# Patient Record
Sex: FEMALE | Race: WHITE | Employment: OTHER | URBAN - METROPOLITAN AREA
[De-identification: names, ages, dates, MRNs, and addresses within clinical notes are randomized per-mention and may not be internally consistent; named-entity substitution may affect disease eponyms.]

---

## 2017-08-11 ENCOUNTER — HOSPITAL ENCOUNTER (EMERGENCY)
Facility: CLINIC | Age: 47
Discharge: HOME | End: 2017-08-11
Attending: EMERGENCY MEDICINE | Admitting: EMERGENCY MEDICINE
Payer: COMMERCIAL

## 2017-08-11 VITALS
OXYGEN SATURATION: 98 % | TEMPERATURE: 98 F | HEIGHT: 65 IN | DIASTOLIC BLOOD PRESSURE: 94 MMHG | SYSTOLIC BLOOD PRESSURE: 117 MMHG | WEIGHT: 217 LBS | BODY MASS INDEX: 36.15 KG/M2 | RESPIRATION RATE: 16 BRPM

## 2017-08-11 DIAGNOSIS — F41.0 ANXIETY ATTACK: ICD-10-CM

## 2017-08-11 PROCEDURE — 99283 EMERGENCY DEPT VISIT LOW MDM: CPT

## 2017-08-11 ASSESSMENT — ENCOUNTER SYMPTOMS
HEADACHES: 1
CHEST TIGHTNESS: 0

## 2017-08-11 NOTE — ED NOTES
A&Ox4. ABC's intact. Pt c/o right shoulder/chest pain after a panic attack.  Her 6yo granddaughter went missing for about an hour and then pt had a panic attack.  Pain 10/10 at this time.

## 2017-08-11 NOTE — ED PROVIDER NOTES
"  History     Chief Complaint:  Panic Attack    HPI   Camial Juarez is a 47 year old female who presents to the emergency department today for evaluation of a panic attack. The patient reports approximately an hour prior to arrival she could not find her 5 year old granddaughter which triggered the panic attack with an onset of a headache. When EMS arrived, she was stuttering and she rated her headache a 10/10. She is under  \"too much stress\" from raising her daughter's children. Her daughter has addiction problems and is unable to care for the patient's grandchildren. The grandchildren have developmental issues which make raising them difficult along with being concerned for their safely because of past instances. She has been seeing a therapist. She denies chest tightness and suicidal ideation.     Allergies:  Penicillins  Compazine [Prochlorperazine]  Morphine  Sulfa Drugs  Tramadol    Medications:    oxycodone-acetaminophen   Hydromorphone   hyoscyamine  docusate sodium   PREDNISONE   Hydrocodone-ibuprofen   FOLIC ACID   Omeprazole-Sodium Bicarbonate   LORAZEPAM   exenatide   Duloxetine   gabapentin   methotrexate     Past Medical History:    LYNN  Anxiety  Type 2 diabetes  Rheumatoid arthritis  Fibromyalgia  Migraine    Past Surgical History:    BACK SURGERY    GYN SURGERY    hysterectomy  HYSTERECTOMY    LAPAROSCOPIC APPENDECTOMY   ORTHOPEDIC SURGERY      Family History:    History reviewed. No pertinent family history.     Social History:  The patient was accompanied to the ED by her sister.  Smoking Status: current  Smokeless Tobacco: never  Alcohol Use: no  Marital Status:  Single     Review of Systems   Respiratory: Negative for chest tightness.    Neurological: Positive for headaches.   Psychiatric/Behavioral: Negative for self-injury and suicidal ideas.   All other systems reviewed and are negative.       Physical Exam   Patient Vitals for the past 24 hrs:   BP Temp Temp src Heart Rate Resp SpO2 " "Height Weight   08/11/17 1350 (!) 117/94 98  F (36.7  C) Oral 87 16 98 % 1.651 m (5' 5\") 98.4 kg (217 lb)      Physical Exam  General: Patient is alert and interactive when I enter the room  Head:  The scalp, face, and head appear normal  Eyes:  Conjunctivae are normal  ENT:    The nose is normal    Pinnae are normal    External acoustic canals are normal  Neck:  Trachea midline  CV:  Pulses are normal, RRR    Resp:  No respiratory distress, CTAB   Abdomen:      Soft, non-tender, non-distended  Musc:  Normal muscular tone    No major joint effusions    No asymmetric leg swelling    Good capillary refill noted  Skin:  No rash or lesions noted  Neuro:  Speech is normal and fluent. Face is symmetric.     Moving all extremities well. Cranial nerves intact. Sensation intact.  Psych: Awake. Alert.  Normal affect.  Appropriate interactions.          Emergency Department Course     Nursing notes and vitals reviewed.  I performed an exam of the patient as documented above.   1430 Patient rechecked and updated.   I discussed the treatment plan with the patient. They expressed understanding of this plan and consented to discharge. They will be discharged home with instructions for care and follow up. In addition, the patient will return to the emergency department if their symptoms persist, worsen, if new symptoms arise or if there is any concern.  All questions were answered.  I personally reviewed the laboratory results with the Patient and sister and answered all related questions prior to discharge.    Impression & Plan      Medical Decision Making:  Camila Juarez is a 47 year old female who presents with fibre myalgia and anxiety. Vitals were unremarkable and physical exam was unremarkable with non-focal neurological exam. The patient had a very stressful day where her daughter, who is dealing with addiction issues, became involved in her granddaughter's care and she has custody of her. It sounds like the patient had " a panic attack today with chest tightness and hyperventilation and some shoulder pain that lead to her migraine. The patient had received an ECG per patient by the medics and per the patient it was normal. I discussed with the patient what she would like to do because on arrival into the room she mentioned she wanted to go home. The patient reported she had resolution of her chest tightness and only felt some residual nausea and felt like she was having a migraine. She has migraine treatments at home and does not want migraine treatment here. In terns of her chest tightness, labs and ECG were offered, however, the patient elected to go home. The differential for her chest tightness include ACS, anxiety, PE, dissection, GERD and others. The patient refused any workup because she reported she was fine. Discussion about the risks of going home including death, were had with the patient and the patient understood. She is not intoxicated and able to make her own decisions so this was likely a panic attack given her history and denied any suicidal ideations as well - so patient was clear for discharge.     Diagnosis:    ICD-10-CM    1. Anxiety attack F41.0        Disposition:  Discharged to home     Scribe Disclosure:  I, Thad Dover, am serving as a scribe at 2:28 PM on 8/11/2017 to document services personally performed by Desi Nguyễn MD based on my observations and the provider's statements to me.     8/11/2017   [facility] EMERGENCY DEPARTMENT       Desi Nguyễn MD  08/11/17 1872

## 2017-08-11 NOTE — DISCHARGE INSTRUCTIONS
Discharge Instructions  Mental Health Concerns    You were seen today for mental health concerns, such as depression, severe anxiety, or suicidal thinking. Your doctor feels that you do not require hospitalization at this time. However, your symptoms may become worse, and you may need to return to the Emergency Department. Most treatments of depression and suicidal thoughts are a process rather than a single intervention.  Medications and counseling can take several weeks or more to help.  It is important to follow up as discussed with your family doctor or counselor.      By accepting these discharge instructions:    You promise to not harm yourself or others.    You agree that if you feel you are becoming unable to keep that promise, you will do something to help yourself before you do anything to harm yourself or others.     You agree to keep any safety plan arranged on your visit here today.    You agree to take any medication prescribed or recommended by your doctor.    If you are getting worse, you can contact a friend or a family member, contact your counselor or family doctor, contact a crisis line, or other options discussed with the doctor or therapist today.    At any time, you can call 911 and return to the emergency department for more help.    You understand that follow-up is essential to your treatment, and you will make and keep appointments recommended on your visit today.    How to improve your mental health and prevent suicide:    Involve others by letting family, friends, counselors know.  Do not isolate yourself.    Avoid alcohol or drugs. Remove weapons, poisons from your home.    Try to stick to routines for eating, sleeping and getting regular exercise.      Try to get into sunlight. Bright natural light not only treats seasonal affective disorder but also depression.    Increase safe activities that you enjoy.    If you feel worse, contact [phone], or call 911, or your family  doctor/counselor for additional assistance.    If you were given a prescription for medicine here today, be sure to read all of the information (including the package insert) that comes with your prescription.  This will include important information about the medicine, its side effects, and any warnings that you need to know about.  The pharmacist who fills the prescription can provide more information and answer questions you may have about the medicine.  If you have questions or concerns that the pharmacist cannot address, please call or return to the Emergency Department.

## 2017-08-11 NOTE — ED AVS SNAPSHOT
[facility] Emergency Department    [address]    [facility] [address]    Phone:  [phone]    Fax:  [phone]                                       Camila Juarez   MRN: [id number]    Department:  [facility] Emergency Department   Date of Visit:  8/11/2017           After Visit Summary Signature Page     I have received my discharge instructions, and my questions have been answered. I have discussed any challenges I see with this plan with the nurse or doctor.    ..........................................................................................................................................  Patient/Patient Representative Signature      ..........................................................................................................................................  Patient Representative Print Name and Relationship to Patient    ..................................................               ................................................  Date                                            Time    ..........................................................................................................................................  Reviewed by Signature/Title    ...................................................              ..............................................  Date                                                            Time

## 2017-08-11 NOTE — ED AVS SNAPSHOT
[facility] Emergency Department    [address]    [facility] [address]    Phone:  [phone]    Fax:  [phone]                                       Camila Juarez   MRN: [id number]    Department:  [facility] Emergency Department   Date of Visit:  8/11/2017           Patient Information     Date Of Birth          1970        Your diagnoses for this visit were:     Anxiety attack        You were seen by Desi Nguyễn MD.      Follow-up Information     Follow up with Cristian Barrow MD In 3 days.    Specialty:  Family Practice    Contact information:    [facility]  [address] [facility] [address] [phone]          Discharge Instructions         Discharge Instructions  Mental Health Concerns    You were seen today for mental health concerns, such as depression, severe anxiety, or suicidal thinking. Your doctor feels that you do not require hospitalization at this time. However, your symptoms may become worse, and you may need to return to the Emergency Department. Most treatments of depression and suicidal thoughts are a process rather than a single intervention.  Medications and counseling can take several weeks or more to help.  It is important to follow up as discussed with your family doctor or counselor.      By accepting these discharge instructions:    You promise to not harm yourself or others.    You agree that if you feel you are becoming unable to keep that promise, you will do something to help yourself before you do anything to harm yourself or others.     You agree to keep any safety plan arranged on your visit here today.    You agree to take any medication prescribed or recommended by your doctor.    If you are getting worse, you can contact a friend or a family member, contact your counselor or family doctor, contact a crisis line, or other options discussed with the doctor or therapist today.    At any time,  you can call 911 and return to the emergency department for more help.    You understand that follow-up is essential to your treatment, and you will make and keep appointments recommended on your visit today.    How to improve your mental health and prevent suicide:    Involve others by letting family, friends, counselors know.  Do not isolate yourself.    Avoid alcohol or drugs. Remove weapons, poisons from your home.    Try to stick to routines for eating, sleeping and getting regular exercise.      Try to get into sunlight. Bright natural light not only treats seasonal affective disorder but also depression.    Increase safe activities that you enjoy.    If you feel worse, contact [phone], or call 911, or your family doctor/counselor for additional assistance.    If you were given a prescription for medicine here today, be sure to read all of the information (including the package insert) that comes with your prescription.  This will include important information about the medicine, its side effects, and any warnings that you need to know about.  The pharmacist who fills the prescription can provide more information and answer questions you may have about the medicine.  If you have questions or concerns that the pharmacist cannot address, please call or return to the Emergency Department.       24 Hour Appointment Hotline       To make an appointment at any [facility], call [phone] ([phone]). If you don't have a family doctor or clinic, we will help you find one. [facility] [place] clinics are conveniently located to serve the needs of you and your family.             Review of your medicines      Our records show that you are taking the medicines listed below. If these are incorrect, please call your family doctor or clinic.        Dose / Directions Last dose taken    CYMBALTA 30 MG EC capsule   Dose:  90 mg   Generic drug:  DULoxetine        Take 90 mg by mouth At Bedtime . 3 caps at HS    Refills:  0        docusate sodium 100 MG capsule   Commonly known as:  COLACE   Dose:  100 mg   Quantity:  8 capsule        Take 1 capsule (100 mg) by mouth 2 times daily as needed for constipation   Refills:  0        exenatide 10 MCG/0.04ML injection   Commonly known as:  BYETTA   Dose:  10 mcg        Inject 10 mcg Subcutaneous daily (with dinner)   Refills:  0        FOLIC ACID PO   Dose:  1 mg        Take 1 mg by mouth daily   Refills:  0        gabapentin 400 MG capsule   Commonly known as:  NEURONTIN   Dose:  1200 mg        Take 1,200 mg by mouth 3 times daily   Refills:  0        HYDROcodone-acetaminophen 7.5-325 MG per tablet   Commonly known as:  NORCO   Dose:  1 tablet        Take 1 tablet by mouth every 4 hours as needed for moderate to severe pain   Refills:  0        HYDROcodone-ibuprofen 7.5-200 MG per tablet   Commonly known as:  VICOPROFEN   Dose:  1 tablet        Take 1 tablet by mouth 2 times daily   Refills:  0        HYDROmorphone 2 MG tablet   Commonly known as:  DILAUDID   Dose:  2 mg   Quantity:  20 tablet        Take 1 tablet (2 mg) by mouth every 4 hours as needed for moderate to severe pain   Refills:  0        hyoscyamine 0.125 MG tablet   Commonly known as:  ANASPAZ/LEVSIN   Dose:  0.125-0.25 mg   Quantity:  10 tablet        Take 1-2 tablets (125-250 mcg) by mouth every 4 hours as needed for cramping   Refills:  1        LORAZEPAM PO   Dose:  0.5 mg        Take 0.5 mg by mouth as needed   Refills:  0        methotrexate 2.5 MG tablet CHEMO   Dose:  12.5 mg        Take 12.5 mg by mouth once a week On wednesday   Refills:  0        omeprazole-sodium bicarbonate  MG Caps per capsule   Dose:  1 capsule        Take 1 capsule by mouth every morning   Refills:  0        oxyCODONE HCl 5 MG Taba   Commonly known as:  OXECTA   Dose:  1-2 tablet   Quantity:  30 each        Take 1-2 tablets by mouth every 3 hours as needed (pain)   Refills:  0        oxyCODONE-acetaminophen  MG per  tablet   Commonly known as:  PERCOCET   Dose:  1 tablet        Take 1 tablet by mouth every 6 hours as needed for moderate to severe pain   Refills:  0        PREDNISONE PO   Dose:  10 mg        Take 10 mg by mouth every morning   Refills:  0                Orders Needing Specimen Collection     None      Pending Results     No orders found from 8/9/2017 to 8/12/2017.            Pending Culture Results     No orders found from 8/9/2017 to 8/12/2017.            Pending Results Instructions     If you had any lab results that were not finalized at the time of your Discharge, you can call the ED Lab Result RN at [phone]. You will be contacted by this team for any positive Lab results or changes in treatment. The nurses are available 7 days a week from 10A to 6:30P.  You can leave a message 24 hours per day and they will return your call.        Test Results From Your Hospital Stay               Clinical Quality Measure: Blood Pressure Screening     Your blood pressure was checked while you were in the emergency department today. The last reading we obtained was  BP: (!) 117/94 . Please read the guidelines below about what these numbers mean and what you should do about them.  If your systolic blood pressure (the top number) is less than 120 and your diastolic blood pressure (the bottom number) is less than 80, then your blood pressure is normal. There is nothing more that you need to do about it.  If your systolic blood pressure (the top number) is 120-139 or your diastolic blood pressure (the bottom number) is 80-89, your blood pressure may be higher than it should be. You should have your blood pressure rechecked within a year by a primary care provider.  If your systolic blood pressure (the top number) is 140 or greater or your diastolic blood pressure (the bottom number) is 90 or greater, you may have high blood pressure. High blood pressure is treatable, but if left untreated over time it can put you at risk  "for heart attack, stroke, or kidney failure. You should have your blood pressure rechecked by a primary care provider within the next 4 weeks.  If your provider in the emergency department today gave you specific instructions to follow-up with your doctor or provider even sooner than that, you should follow that instruction and not wait for up to 4 weeks for your follow-up visit.        Thank you for choosing [facility]       Thank you for choosing [facility] for your care. Our goal is always to provide you with excellent care. Hearing back from our patients is one way we can continue to improve our services. Please take a few minutes to complete the written survey that you may receive in the mail after you visit with us. Thank you!        [facility] Information     [employer] lets you send messages to your doctor, view your test results, renew your prescriptions, schedule appointments and more. To sign up, go to [email or web address] . Click on \"Log in\" on the left side of the screen, which will take you to the Welcome page. Then click on \"Sign up Now\" on the right side of the page.     You will be asked to enter the access code listed below, as well as some personal information. Please follow the directions to create your username and password.     Your access code is: [email or web address]  Expires: 2017  2:23 PM     Your access code will  in 90 days. If you need help or a new code, please call your [facility] clinic or [phone].        Care EveryWhere ID     This is your Care EveryWhere ID. This could be used by other organizations to access your [facility] medical records  [id number]        Equal Access to Services     AVA PRATT : Hadii eduardo Keith, waaxda luqkonstantin, qaybta kaallu santiago. So [facility] [phone].    ATENCIÓN: Si habla español, tiene a giang disposición servicios gratuitos de asistencia lingüística. Llame al [phone].    We comply with " applicable federal civil rights laws and [place] laws. We do not discriminate on the basis of race, color, national origin, age, disability sex, sexual orientation or gender identity.            After Visit Summary       This is your record. Keep this with you and show to your community pharmacist(s) and doctor(s) at your next visit.

## 2017-08-12 ENCOUNTER — HEALTH MAINTENANCE LETTER (OUTPATIENT)
Age: 47
End: 2017-08-12

## 2020-02-06 ENCOUNTER — HOSPITAL ENCOUNTER (EMERGENCY)
Facility: CLINIC | Age: 50
Discharge: HOME | End: 2020-02-06
Attending: EMERGENCY MEDICINE | Admitting: EMERGENCY MEDICINE
Payer: MEDICARE

## 2020-02-06 ENCOUNTER — APPOINTMENT (OUTPATIENT)
Dept: CT IMAGING | Facility: CLINIC | Age: 50
End: 2020-02-06
Attending: EMERGENCY MEDICINE
Payer: MEDICARE

## 2020-02-06 VITALS
WEIGHT: 210 LBS | OXYGEN SATURATION: 96 % | DIASTOLIC BLOOD PRESSURE: 47 MMHG | BODY MASS INDEX: 34.95 KG/M2 | RESPIRATION RATE: 18 BRPM | TEMPERATURE: 98.6 F | SYSTOLIC BLOOD PRESSURE: 98 MMHG

## 2020-02-06 DIAGNOSIS — K04.7 PERIAPICAL ABSCESS: ICD-10-CM

## 2020-02-06 LAB
ANION GAP SERPL CALCULATED.3IONS-SCNC: 2 MMOL/L (ref 3–14)
BASOPHILS # BLD AUTO: 0 10E9/L (ref 0–0.2)
BASOPHILS NFR BLD AUTO: 0.1 %
BUN SERPL-MCNC: 21 MG/DL (ref 7–30)
CALCIUM SERPL-MCNC: 9.7 MG/DL (ref 8.5–10.1)
CHLORIDE SERPL-SCNC: 104 MMOL/L (ref 94–109)
CO2 SERPL-SCNC: 30 MMOL/L (ref 20–32)
CREAT SERPL-MCNC: 0.68 MG/DL (ref 0.52–1.04)
DIFFERENTIAL METHOD BLD: ABNORMAL
EOSINOPHIL # BLD AUTO: 0 10E9/L (ref 0–0.7)
EOSINOPHIL NFR BLD AUTO: 0 %
ERYTHROCYTE [DISTWIDTH] IN BLOOD BY AUTOMATED COUNT: 12.2 % (ref 10–15)
GFR SERPL CREATININE-BSD FRML MDRD: >90 ML/MIN/{1.73_M2}
GLUCOSE SERPL-MCNC: 114 MG/DL (ref 70–99)
HCT VFR BLD AUTO: 45.4 % (ref 35–47)
HGB BLD-MCNC: 15.1 G/DL (ref 11.7–15.7)
IMM GRANULOCYTES # BLD: 0.1 10E9/L (ref 0–0.4)
IMM GRANULOCYTES NFR BLD: 0.5 %
LACTATE BLD-SCNC: 1.7 MMOL/L (ref 0.7–2)
LYMPHOCYTES # BLD AUTO: 3 10E9/L (ref 0.8–5.3)
LYMPHOCYTES NFR BLD AUTO: 27.3 %
MCH RBC QN AUTO: 32 PG (ref 26.5–33)
MCHC RBC AUTO-ENTMCNC: 33.3 G/DL (ref 31.5–36.5)
MCV RBC AUTO: 96 FL (ref 78–100)
MONOCYTES # BLD AUTO: 0.8 10E9/L (ref 0–1.3)
MONOCYTES NFR BLD AUTO: 7.4 %
NEUTROPHILS # BLD AUTO: 7.2 10E9/L (ref 1.6–8.3)
NEUTROPHILS NFR BLD AUTO: 64.7 %
NRBC # BLD AUTO: 0 10*3/UL
NRBC BLD AUTO-RTO: 0 /100
PLATELET # BLD AUTO: 303 10E9/L (ref 150–450)
POTASSIUM SERPL-SCNC: 3.6 MMOL/L (ref 3.4–5.3)
RBC # BLD AUTO: 4.72 10E12/L (ref 3.8–5.2)
SODIUM SERPL-SCNC: 136 MMOL/L (ref 133–144)
WBC # BLD AUTO: 11.1 10E9/L (ref 4–11)

## 2020-02-06 PROCEDURE — 99285 EMERGENCY DEPT VISIT HI MDM: CPT | Mod: 25

## 2020-02-06 PROCEDURE — 83605 ASSAY OF LACTIC ACID: CPT | Performed by: EMERGENCY MEDICINE

## 2020-02-06 PROCEDURE — 25000132 ZZH RX MED GY IP 250 OP 250 PS 637: Performed by: EMERGENCY MEDICINE

## 2020-02-06 PROCEDURE — 85025 COMPLETE CBC W/AUTO DIFF WBC: CPT | Performed by: EMERGENCY MEDICINE

## 2020-02-06 PROCEDURE — 80048 BASIC METABOLIC PNL TOTAL CA: CPT | Performed by: EMERGENCY MEDICINE

## 2020-02-06 PROCEDURE — 25000125 ZZHC RX 250: Performed by: EMERGENCY MEDICINE

## 2020-02-06 PROCEDURE — 99284 EMERGENCY DEPT VISIT MOD MDM: CPT | Mod: Z6 | Performed by: EMERGENCY MEDICINE

## 2020-02-06 PROCEDURE — 96365 THER/PROPH/DIAG IV INF INIT: CPT | Mod: 59

## 2020-02-06 PROCEDURE — 25000128 H RX IP 250 OP 636: Performed by: EMERGENCY MEDICINE

## 2020-02-06 PROCEDURE — 96375 TX/PRO/DX INJ NEW DRUG ADDON: CPT

## 2020-02-06 PROCEDURE — 25000128 H RX IP 250 OP 636: Performed by: STUDENT IN AN ORGANIZED HEALTH CARE EDUCATION/TRAINING PROGRAM

## 2020-02-06 PROCEDURE — 70487 CT MAXILLOFACIAL W/DYE: CPT

## 2020-02-06 PROCEDURE — 96366 THER/PROPH/DIAG IV INF ADDON: CPT

## 2020-02-06 RX ORDER — CLINDAMYCIN HCL 300 MG
300 CAPSULE ORAL 4 TIMES DAILY
COMMUNITY
End: 2020-06-21

## 2020-02-06 RX ORDER — CLINDAMYCIN PHOSPHATE 900 MG/50ML
900 INJECTION, SOLUTION INTRAVENOUS EVERY 8 HOURS
Status: DISCONTINUED | OUTPATIENT
Start: 2020-02-06 | End: 2020-02-06 | Stop reason: HOSPADM

## 2020-02-06 RX ORDER — MELOXICAM 15 MG/1
15 TABLET ORAL DAILY
COMMUNITY

## 2020-02-06 RX ORDER — IOPAMIDOL 755 MG/ML
75 INJECTION, SOLUTION INTRAVASCULAR ONCE
Status: COMPLETED | OUTPATIENT
Start: 2020-02-06 | End: 2020-02-06

## 2020-02-06 RX ORDER — BUSPIRONE HYDROCHLORIDE 10 MG/1
10 TABLET ORAL 2 TIMES DAILY
COMMUNITY

## 2020-02-06 RX ORDER — OXYCODONE HYDROCHLORIDE 5 MG/1
10 TABLET ORAL ONCE
Status: COMPLETED | OUTPATIENT
Start: 2020-02-06 | End: 2020-02-06

## 2020-02-06 RX ORDER — MODAFINIL 200 MG/1
200 TABLET ORAL DAILY
COMMUNITY

## 2020-02-06 RX ORDER — LIRAGLUTIDE 6 MG/ML
1.8 INJECTION SUBCUTANEOUS DAILY
COMMUNITY
End: 2020-06-21

## 2020-02-06 RX ORDER — KETOROLAC TROMETHAMINE 15 MG/ML
15 INJECTION, SOLUTION INTRAMUSCULAR; INTRAVENOUS ONCE
Status: COMPLETED | OUTPATIENT
Start: 2020-02-06 | End: 2020-02-06

## 2020-02-06 RX ORDER — VENLAFAXINE HYDROCHLORIDE 150 MG/1
150 CAPSULE, EXTENDED RELEASE ORAL DAILY
COMMUNITY

## 2020-02-06 RX ORDER — SIMVASTATIN 80 MG
80 TABLET ORAL AT BEDTIME
COMMUNITY

## 2020-02-06 RX ORDER — LISINOPRIL AND HYDROCHLOROTHIAZIDE 20; 25 MG/1; MG/1
1 TABLET ORAL DAILY
Status: ON HOLD | COMMUNITY
End: 2020-06-22

## 2020-02-06 RX ADMIN — OXYCODONE HYDROCHLORIDE 10 MG: 5 TABLET ORAL at 18:55

## 2020-02-06 RX ADMIN — IOPAMIDOL 75 ML: 755 INJECTION, SOLUTION INTRAVENOUS at 19:23

## 2020-02-06 RX ADMIN — CLINDAMYCIN PHOSPHATE 900 MG: 900 INJECTION, SOLUTION INTRAVENOUS at 18:47

## 2020-02-06 RX ADMIN — KETOROLAC TROMETHAMINE 15 MG: 15 INJECTION, SOLUTION INTRAMUSCULAR; INTRAVENOUS at 20:37

## 2020-02-06 NOTE — ED AVS SNAPSHOT
[facility] [place], Emergency Department  [address] [place] [address]  Phone:  [phone]                                    Camila Juarez   MRN: [id number]    Department:  [facility], Emergency Department   Date of Visit:  2/6/2020           After Visit Summary Signature Page    I have received my discharge instructions, and my questions have been answered. I have discussed any challenges I see with this plan with the nurse or doctor.    ..........................................................................................................................................  Patient/Patient Representative Signature      ..........................................................................................................................................  Patient Representative Print Name and Relationship to Patient    ..................................................               ................................................  Date                                   Time    ..........................................................................................................................................  Reviewed by Signature/Title    ...................................................              ..............................................  Date                                               Time          22EPIC Rev 08/18

## 2020-02-06 NOTE — ED TRIAGE NOTES
Camila ambulatory to ED for ongoing right lower dental pain/infection that started Sunday 2/2/2020.  Pt reports she saw her dentist on Tuesday and was told she needed a tooth extraction, but needed to be on antibiotics first. Pt started on Clindamycin 400 mg 4 times per day. Pt tried to go to  of  emergency dental clinic today and they could *not* fit her in the schedule. Pt has had increased right lower jaw pain and swelling, worse past few days.

## 2020-02-07 ASSESSMENT — ENCOUNTER SYMPTOMS
SHORTNESS OF BREATH: 0
EYE PAIN: 0
CHILLS: 0
FEVER: 0

## 2020-02-07 NOTE — DISCHARGE INSTRUCTIONS
Please go to Dental Clinic at 9:30am tomorrow at the [facility] ([address]). Call them at  to confirm.

## 2020-02-08 NOTE — ED PROVIDER NOTES
History     Chief Complaint   Patient presents with     Dental Pain     HPI  Camila Juarez is a 49 year old female with history of dental caries and recent diagnosis of tooth infection who comes in with complaints of worsening tooth pain and new facial swelling despite being started on antibiotics by her dentist 2 days prior to arrival.  She denies any fevers or chills, no pain about the mid face.    I have reviewed the Medications, Allergies, Past Medical and Surgical History, and Social History in the Epic system.    Review of Systems   Constitutional: Negative for chills and fever.   Eyes: Negative for pain.   Respiratory: Negative for shortness of breath.    Cardiovascular: Negative for chest pain.       Physical Exam   BP: (!) 151/89  Heart Rate: 61  Temp: 98.6  F (37  C)  Resp: 16  Weight: 95.3 kg (210 lb)  SpO2: 96 %      Physical Exam  Vitals signs and nursing note reviewed.   Constitutional:       General: She is not in acute distress.     Appearance: Normal appearance. She is not diaphoretic.   HENT:      Head: Atraumatic.      Comments: Mild right-sided mandibular swelling     Mouth/Throat:      Pharynx: No oropharyngeal exudate.      Comments: Poor dentition with exquisite tenderness at base of right mandible intraorally.  Eyes:      General: No scleral icterus.     Pupils: Pupils are equal, round, and reactive to light.   Cardiovascular:      Heart sounds: Normal heart sounds.   Pulmonary:      Effort: No respiratory distress.      Breath sounds: Normal breath sounds.   Abdominal:      General: Bowel sounds are normal.      Palpations: Abdomen is soft.      Tenderness: There is no abdominal tenderness.   Musculoskeletal:         General: No tenderness.   Skin:     General: Skin is warm.      Findings: No rash.   Neurological:      Mental Status: She is alert.         ED Course        Procedures                           Labs Ordered and Resulted from Time of ED Arrival Up to the Time of Departure  from the ED   CBC WITH PLATELETS DIFFERENTIAL - Abnormal; Notable for the following components:       Result Value    WBC 11.1 (*)     All other components within normal limits   BASIC METABOLIC PANEL - Abnormal; Notable for the following components:    Anion Gap 2 (*)     Glucose 114 (*)     All other components within normal limits   LACTIC ACID WHOLE BLOOD   PERIPHERAL IV CATHETER   NURSING DRAW AND HOLD            Assessments & Plan (with Medical Decision Making)     49-year-old female with complaint of right-sided tooth pain and infection despite analgesics and antibiotics at home.  IV established, patient given Toradol 50 mg IV and oxycodone 10 mg p.o. with adequate relief of her pain.  Labs are drawn sent reviewed document in epic remarkable for WBC of 11.1 thousand but otherwise normal CBC and normal electrolytes.  Patient was given clindamycin 900 mg IV and sent to CT for imaging of the facial bones which revealed periapical lucency around the left first molar but no clear drainable fluid collection.  Case discussed with dental resident who reviewed the images over the phone and will make arrangements for follow-up with dental clinic at 930 following morning.  Plan discussed with patient and she was agreeable.    I have reviewed the nursing notes.    I have reviewed the findings, diagnosis, plan and need for follow up with the patient.    Discharge Medication List as of 2/6/2020  8:34 PM          Final diagnoses:   Periapical abscess       2/6/2020   [facility], [place], EMERGENCY DEPARTMENT     Pual Espana MD  02/07/20 2045

## 2020-03-22 ENCOUNTER — HEALTH MAINTENANCE LETTER (OUTPATIENT)
Age: 50
End: 2020-03-22

## 2020-04-21 ENCOUNTER — HOSPITAL ENCOUNTER (EMERGENCY)
Facility: CLINIC | Age: 50
Discharge: HOME | End: 2020-04-21
Attending: EMERGENCY MEDICINE | Admitting: EMERGENCY MEDICINE
Payer: MEDICARE

## 2020-04-21 ENCOUNTER — APPOINTMENT (OUTPATIENT)
Dept: GENERAL RADIOLOGY | Facility: CLINIC | Age: 50
End: 2020-04-21
Attending: EMERGENCY MEDICINE
Payer: MEDICARE

## 2020-04-21 VITALS
RESPIRATION RATE: 19 BRPM | HEART RATE: 60 BPM | OXYGEN SATURATION: 96 % | SYSTOLIC BLOOD PRESSURE: 106 MMHG | DIASTOLIC BLOOD PRESSURE: 72 MMHG | TEMPERATURE: 98.7 F

## 2020-04-21 DIAGNOSIS — Z20.822 SUSPECTED 2019 NOVEL CORONAVIRUS INFECTION: ICD-10-CM

## 2020-04-21 LAB
ANION GAP SERPL CALCULATED.3IONS-SCNC: 5 MMOL/L (ref 3–14)
BASOPHILS # BLD AUTO: 0 10E9/L (ref 0–0.2)
BASOPHILS NFR BLD AUTO: 0.2 %
BUN SERPL-MCNC: 24 MG/DL (ref 7–30)
CALCIUM SERPL-MCNC: 9 MG/DL (ref 8.5–10.1)
CHLORIDE SERPL-SCNC: 104 MMOL/L (ref 94–109)
CO2 SERPL-SCNC: 28 MMOL/L (ref 20–32)
CREAT SERPL-MCNC: 0.63 MG/DL (ref 0.52–1.04)
DIFFERENTIAL METHOD BLD: ABNORMAL
EOSINOPHIL # BLD AUTO: 0 10E9/L (ref 0–0.7)
EOSINOPHIL NFR BLD AUTO: 0 %
ERYTHROCYTE [DISTWIDTH] IN BLOOD BY AUTOMATED COUNT: 12.7 % (ref 10–15)
GFR SERPL CREATININE-BSD FRML MDRD: >90 ML/MIN/{1.73_M2}
GLUCOSE SERPL-MCNC: 109 MG/DL (ref 70–99)
HCT VFR BLD AUTO: 43.7 % (ref 35–47)
HGB BLD-MCNC: 14.4 G/DL (ref 11.7–15.7)
IMM GRANULOCYTES # BLD: 0.1 10E9/L (ref 0–0.4)
IMM GRANULOCYTES NFR BLD: 0.9 %
LYMPHOCYTES # BLD AUTO: 2.6 10E9/L (ref 0.8–5.3)
LYMPHOCYTES NFR BLD AUTO: 22.1 %
MCH RBC QN AUTO: 32.1 PG (ref 26.5–33)
MCHC RBC AUTO-ENTMCNC: 33 G/DL (ref 31.5–36.5)
MCV RBC AUTO: 97 FL (ref 78–100)
MONOCYTES # BLD AUTO: 0.8 10E9/L (ref 0–1.3)
MONOCYTES NFR BLD AUTO: 7.3 %
NEUTROPHILS # BLD AUTO: 8.1 10E9/L (ref 1.6–8.3)
NEUTROPHILS NFR BLD AUTO: 69.5 %
NRBC # BLD AUTO: 0 10*3/UL
NRBC BLD AUTO-RTO: 0 /100
PLATELET # BLD AUTO: 315 10E9/L (ref 150–450)
POTASSIUM SERPL-SCNC: 3.8 MMOL/L (ref 3.4–5.3)
RBC # BLD AUTO: 4.49 10E12/L (ref 3.8–5.2)
SODIUM SERPL-SCNC: 137 MMOL/L (ref 133–144)
TROPONIN I SERPL-MCNC: <0.015 UG/L (ref 0–0.04)
WBC # BLD AUTO: 11.6 10E9/L (ref 4–11)

## 2020-04-21 PROCEDURE — 71045 X-RAY EXAM CHEST 1 VIEW: CPT

## 2020-04-21 PROCEDURE — 85025 COMPLETE CBC W/AUTO DIFF WBC: CPT | Performed by: EMERGENCY MEDICINE

## 2020-04-21 PROCEDURE — 84484 ASSAY OF TROPONIN QUANT: CPT | Performed by: EMERGENCY MEDICINE

## 2020-04-21 PROCEDURE — 25800030 ZZH RX IP 258 OP 636: Performed by: EMERGENCY MEDICINE

## 2020-04-21 PROCEDURE — 96374 THER/PROPH/DIAG INJ IV PUSH: CPT

## 2020-04-21 PROCEDURE — 99285 EMERGENCY DEPT VISIT HI MDM: CPT | Mod: 25

## 2020-04-21 PROCEDURE — 96361 HYDRATE IV INFUSION ADD-ON: CPT

## 2020-04-21 PROCEDURE — 80048 BASIC METABOLIC PNL TOTAL CA: CPT | Performed by: EMERGENCY MEDICINE

## 2020-04-21 PROCEDURE — 93005 ELECTROCARDIOGRAM TRACING: CPT

## 2020-04-21 PROCEDURE — 25000128 H RX IP 250 OP 636: Performed by: EMERGENCY MEDICINE

## 2020-04-21 RX ORDER — KETOROLAC TROMETHAMINE 30 MG/ML
30 INJECTION, SOLUTION INTRAMUSCULAR; INTRAVENOUS ONCE
Status: COMPLETED | OUTPATIENT
Start: 2020-04-21 | End: 2020-04-21

## 2020-04-21 RX ADMIN — SODIUM CHLORIDE 1000 ML: 9 INJECTION, SOLUTION INTRAVENOUS at 21:02

## 2020-04-21 RX ADMIN — KETOROLAC TROMETHAMINE 30 MG: 30 INJECTION, SOLUTION INTRAMUSCULAR at 21:02

## 2020-04-21 ASSESSMENT — ENCOUNTER SYMPTOMS
FEVER: 0
NAUSEA: 0
FATIGUE: 1
CHILLS: 1
MYALGIAS: 1
DIARRHEA: 0
SORE THROAT: 1
COUGH: 1
SHORTNESS OF BREATH: 1
VOMITING: 0

## 2020-04-21 NOTE — ED AVS SNAPSHOT
[facility]  [address]  [facility] [address]  Phone:  [phone]  Fax:  [phone]                                    Camila Juarez   MRN: [id number]    Department:  [facility] Emergency Department   Date of Visit:  4/21/2020           After Visit Summary Signature Page    I have received my discharge instructions, and my questions have been answered. I have discussed any challenges I see with this plan with the nurse or doctor.    ..........................................................................................................................................  Patient/Patient Representative Signature      ..........................................................................................................................................  Patient Representative Print Name and Relationship to Patient    ..................................................               ................................................  Date                                   Time    ..........................................................................................................................................  Reviewed by Signature/Title    ...................................................              ..............................................  Date                                               Time          22EPIC Rev 08/18

## 2020-04-22 LAB — INTERPRETATION ECG - MUSE: NORMAL

## 2020-04-22 NOTE — ED NOTES
Patient and family (via speaker phone) educated on COVID testing guidelines, self-isolation, and symptom management. Verbalized understanding.

## 2020-04-22 NOTE — ED PROVIDER NOTES
History     Chief Complaint:  Chest pain, SOB     HPI   Camila Juarez is a 49 year old female with a history of anxiety, depression, diabetes, GERD, hypertension and rheumatoid arthritis who presents to the emergency department for evaluation of multiple complaints including chest pain, shortness of breath, body aches, chills and generalized fatigue.  Patient reports 3 weeks of upper respiratory symptoms which began with cough, low-grade fever and pleuritic chest pain.  She was evaluated at outside clinic on 4/14/2020.  At that time she had a chest x-ray performed which did not show any acute signs of pneumonia however she was started on Omnicef and prednisone.  She reports that she took her antibiotics and steroids without significant improvement of her symptoms.  Thus prompting her presentation to the emergency department today.  Here she reports ongoing chest pain and shortness of breath but notes her cough is improved.  The patient notes her chills and generalized fatigue have continued.  Patient notes that she has been taking care of her grandchildren who have had some similar symptoms over the past month.    Allergies:  Penicillins  Compazine [Prochlorperazine]  Morphine  Sulfa Drugs  Tramadol     Medications:    Buspar  Cleocin  Colace  Neurontin  Victoza  Lisinopril  Lorazepam  Meloxicam  Provigil  Omeprazole-sodium bicarbonate  Zyampza  Zocor  Effexor    Past Medical History:    Anxiety  Back pain  Depression  Diabetes  Endometriosis  Fibromyalgia  GERD  Hypertension  Migraine  Rheumatoid arthritis with rheumatoid factor    Past Surgical History:    Back surgery  GYN surgery  Hysterectomy  Laparoscopic appendectomy  Orthopedic surgery    Family History:    No past pertinent family history.    Social History:  The patient presents today alone.  Smoking Status: Current Every Day Smoker  Smokeless Tobacco: Never Used  Alcohol Use: No  Drug Use: No  PCP: Mor Lopez      Review of Systems    Constitutional: Positive for chills and fatigue. Negative for fever.   HENT: Positive for sore throat.    Respiratory: Positive for cough and shortness of breath.    Cardiovascular: Positive for chest pain.   Gastrointestinal: Negative for diarrhea, nausea and vomiting.   Musculoskeletal: Positive for myalgias.   All other systems reviewed and are negative.    Physical Exam     Patient Vitals for the past 24 hrs:   BP Temp Heart Rate Resp SpO2   04/21/20 2039 109/79 98.7  F (37.1  C) 79 18 95 %        Physical Exam  General: Patient is awake, alert and interactive when I enter the room. Obese, resting comfortably in bed.   Head: The scalp, face, and head appear normal  Eyes: The pupils are equal, round, and reactive to light. Conjunctivae and sclerae are normal  Neck: Normal range of motion. No anterior cervical lymphadenopathy noted  CV: Regular rate. S1/S2. No murmurs.   Resp: Lungs are clear without wheezes or rales. No respiratory distress.   GI: Abdomen is soft, no rigidity, guarding, or rebound. No distension. No tenderness to palpation in [id number] quadrant.     MS: Normal tone. Joints grossly normal without effusions. No asymmetric leg swelling, calf or thigh tenderness.    Skin: No rash or lesions noted. Normal capillary refill noted  Neuro: Speech is normal and fluent. Face is symmetric. Moving all extremities.   Psych:  Normal affect.  Appropriate interactions.    Emergency Department Course     ECG:  Time: 2053  Vent. Rate 65 bpm. CO interval 134. QRS duration 104. QT/QTc 426/443. P-R-T axis 21 -2 38.  Sinus rhythm  Normal ECG    Imaging:  Radiology findings were communicated with the patient who voiced understanding of the findings.    XR Chest Port 1 View:  Heart size and pulmonary vascularity normal. The lungs are clear.  As per radiology.    Laboratory:  Laboratory findings were communicated with the patient who voiced understanding of the findings.    CBC: WBC 11.6 (H) o/w WNL. (HGB 14.4, )    BMP: Glucose 109 (H) o/w WNL (Creatinine 0.63)    Troponin I <0.015      Interventions:  2102 NS 1L IV    2102 Toradol 30 mg IV    Emergency Department Course:    2039 Nursing notes and vitals reviewed.    2042 I performed an exam of the patient as documented above.     2046 IV was inserted and blood was drawn for laboratory testing, results above.    2053 EKG obtained as noted above.    2056 The patient was sent for a XR Chest Port 1 View while in the emergency department, results above.      Findings and plan explained to the Patient. Patient discharged home with instructions regarding supportive care, medications, and reasons to return. The importance of close follow-up was reviewed.     Impression & Plan     Medical Decision Making:  Camila Juarez is a 49 year old female with past medical history of anxiety, depression, diabetes, GERD, hypertension and rheumatoid arthritis who presents to the emergency department for evaluation of multiple complaints including chest pain, shortness of breath, body aches, chills and generalized fatigue.  Please see HPI for more details.  Upon initial evaluation here she is hemodynamically stable with normal vital signs.  She is afebrile.  Chest x-ray was obtained which did not show any acute signs of pneumonia, pneumothorax, widened mediastinum, pleural effusion or pulmonary edema.  EKG did not show any significant signs of ischemia nor dysrhythmia.  Troponin x1 is negative.  Given the length of her symptoms and overall constellation of her presentation I feel that ACS is much less likely in this scenario.  This may represent COVID-19 or additional viral upper respiratory infection.  Given that the patient is otherwise hemodynamically stable without significant hypoxia or fever, I do not believe that the patient requires testing here today nor admission to the hospital. Return to the ED for shortness of breath, or confusion.  There is no signs of serious bacterial  infection such as bacteremia, meningitis, UTI/pyelonephritis, strep pharyngitis, etc. I discussed my findings and plan with the patient and they are amenable at this time.  All questions were answered and patient will be discharged home in stable condition.     Discharge Diagnosis:    ICD-10-CM    1. Suspected 2019 Novel Coronavirus Infection  Z20.828      Disposition:  The patient is discharged to home.     Scribe Disclosure:  I, Castillo Shelton, am serving as a scribe at 8:39 PM on 4/21/2020 to document services personally performed by Brock Jesus MD based on my observations and the provider's statements to me.      Brock Jesus MD  04/21/20 3640

## 2020-04-22 NOTE — ED NOTES
Bed: ED16  Expected date: 4/21/20  Expected time: 8:32 PM  Means of arrival: Ambulance  Comments:  Narendra 596 49F chest pain/sob/pna

## 2020-06-21 ENCOUNTER — APPOINTMENT (OUTPATIENT)
Dept: GENERAL RADIOLOGY | Facility: CLINIC | Age: 50
End: 2020-06-21
Attending: EMERGENCY MEDICINE
Payer: MEDICARE

## 2020-06-21 ENCOUNTER — HOSPITAL ENCOUNTER (OUTPATIENT)
Facility: CLINIC | Age: 50
Setting detail: OBSERVATION
Discharge: HOME | End: 2020-06-22
Attending: EMERGENCY MEDICINE | Admitting: HOSPITALIST
Payer: MEDICARE

## 2020-06-21 ENCOUNTER — APPOINTMENT (OUTPATIENT)
Dept: MRI IMAGING | Facility: CLINIC | Age: 50
End: 2020-06-21
Attending: EMERGENCY MEDICINE
Payer: MEDICARE

## 2020-06-21 ENCOUNTER — APPOINTMENT (OUTPATIENT)
Dept: CT IMAGING | Facility: CLINIC | Age: 50
End: 2020-06-21
Attending: EMERGENCY MEDICINE
Payer: MEDICARE

## 2020-06-21 DIAGNOSIS — R25.9 ABNORMAL INVOLUNTARY MOVEMENT: ICD-10-CM

## 2020-06-21 DIAGNOSIS — G43.809 OTHER MIGRAINE WITHOUT STATUS MIGRAINOSUS, NOT INTRACTABLE: Primary | ICD-10-CM

## 2020-06-21 DIAGNOSIS — R51.9 NONINTRACTABLE HEADACHE, UNSPECIFIED CHRONICITY PATTERN, UNSPECIFIED HEADACHE TYPE: ICD-10-CM

## 2020-06-21 LAB
ALBUMIN SERPL-MCNC: 3.4 G/DL (ref 3.4–5)
ALBUMIN UR-MCNC: NEGATIVE MG/DL
ALP SERPL-CCNC: 69 U/L (ref 40–150)
ALT SERPL W P-5'-P-CCNC: 25 U/L (ref 0–50)
ANION GAP SERPL CALCULATED.3IONS-SCNC: 8 MMOL/L (ref 3–14)
APPEARANCE UR: CLEAR
AST SERPL W P-5'-P-CCNC: 19 U/L (ref 0–45)
BASOPHILS # BLD AUTO: 0 10E9/L (ref 0–0.2)
BASOPHILS NFR BLD AUTO: 0.2 %
BILIRUB DIRECT SERPL-MCNC: <0.1 MG/DL (ref 0–0.2)
BILIRUB SERPL-MCNC: 0.2 MG/DL (ref 0.2–1.3)
BILIRUB UR QL STRIP: NEGATIVE
BUN SERPL-MCNC: 43 MG/DL (ref 7–30)
CALCIUM SERPL-MCNC: 8.6 MG/DL (ref 8.5–10.1)
CHLORIDE SERPL-SCNC: 100 MMOL/L (ref 94–109)
CO2 SERPL-SCNC: 26 MMOL/L (ref 20–32)
COLOR UR AUTO: NORMAL
CREAT SERPL-MCNC: 1.27 MG/DL (ref 0.52–1.04)
CRP SERPL-MCNC: 14.9 MG/L (ref 0–8)
DIFFERENTIAL METHOD BLD: NORMAL
EOSINOPHIL # BLD AUTO: 0 10E9/L (ref 0–0.7)
EOSINOPHIL NFR BLD AUTO: 0 %
ERYTHROCYTE [DISTWIDTH] IN BLOOD BY AUTOMATED COUNT: 12 % (ref 10–15)
ERYTHROCYTE [SEDIMENTATION RATE] IN BLOOD BY WESTERGREN METHOD: 11 MM/H (ref 0–30)
GFR SERPL CREATININE-BSD FRML MDRD: 49 ML/MIN/{1.73_M2}
GLUCOSE SERPL-MCNC: 164 MG/DL (ref 70–99)
GLUCOSE UR STRIP-MCNC: NEGATIVE MG/DL
HBA1C MFR BLD: 6.2 % (ref 0–5.6)
HCT VFR BLD AUTO: 43.5 % (ref 35–47)
HGB BLD-MCNC: 14.5 G/DL (ref 11.7–15.7)
HGB UR QL STRIP: NEGATIVE
IMM GRANULOCYTES # BLD: 0.1 10E9/L (ref 0–0.4)
IMM GRANULOCYTES NFR BLD: 0.5 %
KETONES UR STRIP-MCNC: NEGATIVE MG/DL
LACTATE BLD-SCNC: 1.7 MMOL/L (ref 0.7–2)
LEUKOCYTE ESTERASE UR QL STRIP: NEGATIVE
LYMPHOCYTES # BLD AUTO: 2.6 10E9/L (ref 0.8–5.3)
LYMPHOCYTES NFR BLD AUTO: 25.3 %
MCH RBC QN AUTO: 31.8 PG (ref 26.5–33)
MCHC RBC AUTO-ENTMCNC: 33.3 G/DL (ref 31.5–36.5)
MCV RBC AUTO: 95 FL (ref 78–100)
MONOCYTES # BLD AUTO: 0.9 10E9/L (ref 0–1.3)
MONOCYTES NFR BLD AUTO: 9.2 %
NEUTROPHILS # BLD AUTO: 6.5 10E9/L (ref 1.6–8.3)
NEUTROPHILS NFR BLD AUTO: 64.8 %
NITRATE UR QL: NEGATIVE
NRBC # BLD AUTO: 0 10*3/UL
NRBC BLD AUTO-RTO: 0 /100
PH UR STRIP: 5 PH (ref 5–7)
PLATELET # BLD AUTO: 271 10E9/L (ref 150–450)
POTASSIUM SERPL-SCNC: 3.8 MMOL/L (ref 3.4–5.3)
PROT SERPL-MCNC: 7.2 G/DL (ref 6.8–8.8)
RBC # BLD AUTO: 4.56 10E12/L (ref 3.8–5.2)
RBC #/AREA URNS AUTO: 0 /HPF (ref 0–2)
SARS-COV-2 PCR COMMENT: NORMAL
SARS-COV-2 RNA SPEC QL NAA+PROBE: NEGATIVE
SARS-COV-2 RNA SPEC QL NAA+PROBE: NORMAL
SODIUM SERPL-SCNC: 134 MMOL/L (ref 133–144)
SOURCE: NORMAL
SP GR UR STRIP: 1.01 (ref 1–1.03)
SPECIMEN SOURCE: NORMAL
SPECIMEN SOURCE: NORMAL
SQUAMOUS #/AREA URNS AUTO: <1 /HPF (ref 0–1)
UROBILINOGEN UR STRIP-MCNC: NORMAL MG/DL (ref 0–2)
WBC # BLD AUTO: 10.1 10E9/L (ref 4–11)
WBC #/AREA URNS AUTO: 0 /HPF (ref 0–5)

## 2020-06-21 PROCEDURE — 70450 CT HEAD/BRAIN W/O DYE: CPT

## 2020-06-21 PROCEDURE — 83605 ASSAY OF LACTIC ACID: CPT | Performed by: EMERGENCY MEDICINE

## 2020-06-21 PROCEDURE — 80048 BASIC METABOLIC PNL TOTAL CA: CPT | Performed by: EMERGENCY MEDICINE

## 2020-06-21 PROCEDURE — U0003 INFECTIOUS AGENT DETECTION BY NUCLEIC ACID (DNA OR RNA); SEVERE ACUTE RESPIRATORY SYNDROME CORONAVIRUS 2 (SARS-COV-2) (CORONAVIRUS DISEASE [COVID-19]), AMPLIFIED PROBE TECHNIQUE, MAKING USE OF HIGH THROUGHPUT TECHNOLOGIES AS DESCRIBED BY CMS-2020-01-R: HCPCS | Performed by: EMERGENCY MEDICINE

## 2020-06-21 PROCEDURE — 85652 RBC SED RATE AUTOMATED: CPT | Performed by: EMERGENCY MEDICINE

## 2020-06-21 PROCEDURE — 87040 BLOOD CULTURE FOR BACTERIA: CPT | Performed by: EMERGENCY MEDICINE

## 2020-06-21 PROCEDURE — 82947 ASSAY GLUCOSE BLOOD QUANT: CPT | Mod: 91 | Performed by: HOSPITALIST

## 2020-06-21 PROCEDURE — 36415 COLL VENOUS BLD VENIPUNCTURE: CPT | Performed by: HOSPITALIST

## 2020-06-21 PROCEDURE — 70553 MRI BRAIN STEM W/O & W/DYE: CPT

## 2020-06-21 PROCEDURE — 93005 ELECTROCARDIOGRAM TRACING: CPT

## 2020-06-21 PROCEDURE — 25000128 H RX IP 250 OP 636: Performed by: EMERGENCY MEDICINE

## 2020-06-21 PROCEDURE — 80076 HEPATIC FUNCTION PANEL: CPT | Performed by: EMERGENCY MEDICINE

## 2020-06-21 PROCEDURE — 96375 TX/PRO/DX INJ NEW DRUG ADDON: CPT

## 2020-06-21 PROCEDURE — 99220 ZZC INITIAL OBSERVATION CARE,LEVL III: CPT | Performed by: HOSPITALIST

## 2020-06-21 PROCEDURE — 96361 HYDRATE IV INFUSION ADD-ON: CPT

## 2020-06-21 PROCEDURE — 96376 TX/PRO/DX INJ SAME DRUG ADON: CPT

## 2020-06-21 PROCEDURE — 96374 THER/PROPH/DIAG INJ IV PUSH: CPT

## 2020-06-21 PROCEDURE — 86140 C-REACTIVE PROTEIN: CPT | Performed by: EMERGENCY MEDICINE

## 2020-06-21 PROCEDURE — 99285 EMERGENCY DEPT VISIT HI MDM: CPT | Mod: 25

## 2020-06-21 PROCEDURE — G0378 HOSPITAL OBSERVATION PER HR: HCPCS

## 2020-06-21 PROCEDURE — 36415 COLL VENOUS BLD VENIPUNCTURE: CPT | Performed by: EMERGENCY MEDICINE

## 2020-06-21 PROCEDURE — C9803 HOPD COVID-19 SPEC COLLECT: HCPCS

## 2020-06-21 PROCEDURE — 71045 X-RAY EXAM CHEST 1 VIEW: CPT

## 2020-06-21 PROCEDURE — 99207 ZZC CDG-CODE CATEGORY CHANGED: CPT | Performed by: HOSPITALIST

## 2020-06-21 PROCEDURE — 83036 HEMOGLOBIN GLYCOSYLATED A1C: CPT | Performed by: HOSPITALIST

## 2020-06-21 PROCEDURE — A9585 GADOBUTROL INJECTION: HCPCS | Performed by: EMERGENCY MEDICINE

## 2020-06-21 PROCEDURE — 25500064 ZZH RX 255 OP 636: Performed by: EMERGENCY MEDICINE

## 2020-06-21 PROCEDURE — 81001 URINALYSIS AUTO W/SCOPE: CPT | Performed by: EMERGENCY MEDICINE

## 2020-06-21 PROCEDURE — 25800030 ZZH RX IP 258 OP 636: Performed by: EMERGENCY MEDICINE

## 2020-06-21 PROCEDURE — 85025 COMPLETE CBC W/AUTO DIFF WBC: CPT | Performed by: EMERGENCY MEDICINE

## 2020-06-21 RX ORDER — LORAZEPAM 2 MG/ML
0.5 INJECTION INTRAMUSCULAR ONCE
Status: COMPLETED | OUTPATIENT
Start: 2020-06-21 | End: 2020-06-21

## 2020-06-21 RX ORDER — TRAZODONE HYDROCHLORIDE 150 MG/1
150 TABLET ORAL AT BEDTIME
COMMUNITY
Start: 2020-03-24

## 2020-06-21 RX ORDER — VENLAFAXINE HYDROCHLORIDE 75 MG/1
75 CAPSULE, EXTENDED RELEASE ORAL DAILY
Status: DISCONTINUED | OUTPATIENT
Start: 2020-06-22 | End: 2020-06-21

## 2020-06-21 RX ORDER — NALOXONE HYDROCHLORIDE 0.4 MG/ML
.1-.4 INJECTION, SOLUTION INTRAMUSCULAR; INTRAVENOUS; SUBCUTANEOUS
Status: DISCONTINUED | OUTPATIENT
Start: 2020-06-21 | End: 2020-06-22 | Stop reason: HOSPADM

## 2020-06-21 RX ORDER — PRAZOSIN HYDROCHLORIDE 2 MG/1
2 CAPSULE ORAL AT BEDTIME
Status: ON HOLD | COMMUNITY
Start: 2020-03-24 | End: 2020-06-22

## 2020-06-21 RX ORDER — ONDANSETRON 4 MG/1
4 TABLET, ORALLY DISINTEGRATING ORAL EVERY 6 HOURS PRN
Status: DISCONTINUED | OUTPATIENT
Start: 2020-06-21 | End: 2020-06-22 | Stop reason: HOSPADM

## 2020-06-21 RX ORDER — LORAZEPAM 0.5 MG/1
0.5 TABLET ORAL EVERY 6 HOURS PRN
Status: DISCONTINUED | OUTPATIENT
Start: 2020-06-21 | End: 2020-06-22 | Stop reason: HOSPADM

## 2020-06-21 RX ORDER — GABAPENTIN 400 MG/1
800 CAPSULE ORAL 4 TIMES DAILY
Status: DISCONTINUED | OUTPATIENT
Start: 2020-06-21 | End: 2020-06-22 | Stop reason: HOSPADM

## 2020-06-21 RX ORDER — PANTOPRAZOLE SODIUM 40 MG/1
40 TABLET, DELAYED RELEASE ORAL DAILY PRN
COMMUNITY
Start: 2020-03-09

## 2020-06-21 RX ORDER — ERGOCALCIFEROL 1.25 MG/1
50000 CAPSULE, LIQUID FILLED ORAL
COMMUNITY
Start: 2020-03-19

## 2020-06-21 RX ORDER — NICOTINE POLACRILEX 4 MG
15-30 LOZENGE BUCCAL
Status: DISCONTINUED | OUTPATIENT
Start: 2020-06-21 | End: 2020-06-22 | Stop reason: HOSPADM

## 2020-06-21 RX ORDER — ACETAMINOPHEN 650 MG/1
650 SUPPOSITORY RECTAL EVERY 4 HOURS PRN
Status: DISCONTINUED | OUTPATIENT
Start: 2020-06-21 | End: 2020-06-22 | Stop reason: HOSPADM

## 2020-06-21 RX ORDER — KETOROLAC TROMETHAMINE 15 MG/ML
15 INJECTION, SOLUTION INTRAMUSCULAR; INTRAVENOUS ONCE
Status: COMPLETED | OUTPATIENT
Start: 2020-06-21 | End: 2020-06-21

## 2020-06-21 RX ORDER — DEXTROSE MONOHYDRATE 25 G/50ML
25-50 INJECTION, SOLUTION INTRAVENOUS
Status: DISCONTINUED | OUTPATIENT
Start: 2020-06-21 | End: 2020-06-22 | Stop reason: HOSPADM

## 2020-06-21 RX ORDER — VENLAFAXINE HYDROCHLORIDE 75 MG/1
225 CAPSULE, EXTENDED RELEASE ORAL DAILY
Status: DISCONTINUED | OUTPATIENT
Start: 2020-06-22 | End: 2020-06-22 | Stop reason: HOSPADM

## 2020-06-21 RX ORDER — OXYCODONE HCL 10 MG/1
10 TABLET, FILM COATED, EXTENDED RELEASE ORAL EVERY 12 HOURS
Status: DISCONTINUED | OUTPATIENT
Start: 2020-06-21 | End: 2020-06-22 | Stop reason: HOSPADM

## 2020-06-21 RX ORDER — LORAZEPAM 0.5 MG/1
0.5 TABLET ORAL EVERY 6 HOURS PRN
COMMUNITY

## 2020-06-21 RX ORDER — SIMVASTATIN 40 MG
80 TABLET ORAL AT BEDTIME
Status: DISCONTINUED | OUTPATIENT
Start: 2020-06-21 | End: 2020-06-22 | Stop reason: HOSPADM

## 2020-06-21 RX ORDER — VENLAFAXINE HYDROCHLORIDE 75 MG/1
75 CAPSULE, EXTENDED RELEASE ORAL DAILY
COMMUNITY

## 2020-06-21 RX ORDER — BUSPIRONE HYDROCHLORIDE 10 MG/1
10 TABLET ORAL 2 TIMES DAILY
Status: DISCONTINUED | OUTPATIENT
Start: 2020-06-21 | End: 2020-06-22 | Stop reason: HOSPADM

## 2020-06-21 RX ORDER — GADOBUTROL 604.72 MG/ML
10 INJECTION INTRAVENOUS ONCE
Status: COMPLETED | OUTPATIENT
Start: 2020-06-21 | End: 2020-06-21

## 2020-06-21 RX ORDER — SODIUM CHLORIDE 9 MG/ML
1000 INJECTION, SOLUTION INTRAVENOUS CONTINUOUS
Status: DISCONTINUED | OUTPATIENT
Start: 2020-06-21 | End: 2020-06-21

## 2020-06-21 RX ORDER — ONDANSETRON 2 MG/ML
4 INJECTION INTRAMUSCULAR; INTRAVENOUS EVERY 6 HOURS PRN
Status: DISCONTINUED | OUTPATIENT
Start: 2020-06-21 | End: 2020-06-22 | Stop reason: HOSPADM

## 2020-06-21 RX ORDER — METOCLOPRAMIDE HYDROCHLORIDE 5 MG/ML
5 INJECTION INTRAMUSCULAR; INTRAVENOUS ONCE
Status: COMPLETED | OUTPATIENT
Start: 2020-06-21 | End: 2020-06-21

## 2020-06-21 RX ORDER — ACETAMINOPHEN 325 MG/1
650 TABLET ORAL EVERY 4 HOURS PRN
Status: DISCONTINUED | OUTPATIENT
Start: 2020-06-21 | End: 2020-06-22 | Stop reason: HOSPADM

## 2020-06-21 RX ORDER — PRAZOSIN HYDROCHLORIDE 1 MG/1
1 CAPSULE ORAL AT BEDTIME
Status: ON HOLD | COMMUNITY
Start: 2018-07-15 | End: 2020-06-22

## 2020-06-21 RX ORDER — GABAPENTIN 800 MG/1
800 TABLET ORAL 4 TIMES DAILY
COMMUNITY

## 2020-06-21 RX ORDER — DIPHENHYDRAMINE HYDROCHLORIDE 50 MG/ML
25 INJECTION INTRAMUSCULAR; INTRAVENOUS ONCE
Status: COMPLETED | OUTPATIENT
Start: 2020-06-21 | End: 2020-06-21

## 2020-06-21 RX ORDER — MODAFINIL 200 MG/1
200 TABLET ORAL DAILY
Status: DISCONTINUED | OUTPATIENT
Start: 2020-06-22 | End: 2020-06-22 | Stop reason: HOSPADM

## 2020-06-21 RX ADMIN — LORAZEPAM 0.5 MG: 2 INJECTION INTRAMUSCULAR; INTRAVENOUS at 19:35

## 2020-06-21 RX ADMIN — SODIUM CHLORIDE 1000 ML: 9 INJECTION, SOLUTION INTRAVENOUS at 16:38

## 2020-06-21 RX ADMIN — SODIUM CHLORIDE 1000 ML: 9 INJECTION, SOLUTION INTRAVENOUS at 16:01

## 2020-06-21 RX ADMIN — METOCLOPRAMIDE HYDROCHLORIDE 5 MG: 5 INJECTION INTRAMUSCULAR; INTRAVENOUS at 18:57

## 2020-06-21 RX ADMIN — DIPHENHYDRAMINE HYDROCHLORIDE 25 MG: 50 INJECTION, SOLUTION INTRAMUSCULAR; INTRAVENOUS at 18:57

## 2020-06-21 RX ADMIN — KETOROLAC TROMETHAMINE 15 MG: 15 INJECTION, SOLUTION INTRAMUSCULAR; INTRAVENOUS at 18:09

## 2020-06-21 RX ADMIN — GADOBUTROL 10 ML: 604.72 INJECTION INTRAVENOUS at 16:13

## 2020-06-21 RX ADMIN — LORAZEPAM 0.5 MG: 2 INJECTION INTRAMUSCULAR; INTRAVENOUS at 16:10

## 2020-06-21 ASSESSMENT — ENCOUNTER SYMPTOMS
SHORTNESS OF BREATH: 1
FEVER: 0
HEADACHES: 1

## 2020-06-21 NOTE — ED TRIAGE NOTES
Patient here with shaking, restlessness and stuttering speech for 1.5 weeks and getting worse. Expressive aphasia in triage, Her sister is helping with interview in triage. She has left eye droopiness since last week, on and off. She had fever/chills/shortness of breath last Sunday and Monday with continued shortness of breath when walking.     Sister Felisa [phone]

## 2020-06-21 NOTE — ED PROVIDER NOTES
History     Chief Complaint:  Shaking; Chest Pain; and Aphasia    HPI   Camila Juarez is a 49 year old female who presents with abnormal movements, slurre speech and headache.  Patient reported onset last Saturday she developed a migraine.  It started in the back of her head.  She then felt very tired and slept for most of the next few days.  She has had migraines in the past.  She started to have drooping on her's face slurred speech and abnormal movements.  In the past she has had slurred speech and facial droop with her migraines.  She is seen a neurologist in the past.  She denies any fevers although has been sweaty.  Denies any cough.  Has had some degree of shortness of breath.  She does take Ativan at home.    Allergies:  Penicillins  Compazine  Morphine  Sulfa Drugs  Tramadol    Medications:    Buspar  Cleocin  Colace  Neurontin  Victoza  Prinzide  Lorazepam  Meloxicam  Provigil  Omeprazole-sodium bicarbonate  Zocor  Effexor  Lioresal   Prednisone  Omnicef  Zofran  Protonix  Desyrel  Bentyl    Past Medical History:    Anxiety  Depressive disorder  Diabetes  Endometriosis  Fibromyalgia  GERD  HTN  Migraine  Rheumatoid arthritis without rheumatoid factor    Past Surgical History:    Back surgery  Hysterectomy  Laparoscopic appendectomy  Arthroscopy knee    Family History:    Father: Arthritis, diabetes, heart disease, HTN, hyperlipidemia, stroke  Mother: Lung cancer  Sister: HTN  Brother(s): Arthritis, HTN    Social History:  The patient was unaccompanied to the ED.  Smoking Status: current every day smoker  Smokeless Tobacco: never used  Alcohol Use: no  Drug Use: no  Marital Status:  Single [1]    Review of Systems   Constitutional: Negative for fever.   Respiratory: Positive for shortness of breath.    Neurological: Positive for headaches.   All other systems reviewed and are negative.    Physical Exam     Patient Vitals for the past 24 hrs:   BP Temp Temp src Pulse Heart Rate Resp SpO2 Weight    06/21/20 1830 105/64 -- -- 75 74 18 94 % --   06/21/20 1815 (!) 87/51 -- -- -- 70 15 92 % --   06/21/20 1800 (!) 82/56 -- -- 75 74 14 93 % --   06/21/20 1750 -- -- -- -- 75 15 93 % --   06/21/20 1745 -- -- -- -- 79 19 93 % --   06/21/20 1740 105/54 -- -- 73 75 16 96 % --   06/21/20 1700 -- -- -- -- 76 -- 93 % --   06/21/20 1650 100/58 -- -- 84 81 15 94 % --   06/21/20 1640 96/53 -- -- 76 78 18 93 % --   06/21/20 1638 -- 98.1  F (36.7  C) Oral -- -- -- -- --   06/21/20 1637 106/50 -- -- -- -- -- -- --   06/21/20 1630 (!) 83/52 -- -- 81 -- -- 93 % --   06/21/20 1610 95/58 -- -- 81 -- -- 94 % --   06/21/20 1600 -- -- -- -- -- -- 94 % --   06/21/20 1510 -- -- -- -- 92 20 94 % --   06/21/20 1500 94/61 -- -- 90 87 16 96 % --   06/21/20 1407 110/72 99.1  F (37.3  C) Oral -- 101 20 99 % 96.6 kg (213 lb)     Physical Exam  General: Patient is alert and interactive when I enter the room  Head:  The scalp, face, and head appear normal  Eyes:  Conjunctivae are normal  ENT:    The nose is normal    Pinnae are normal    External acoustic canals are normal  Neck:  Trachea midline  CV:  Pulses are normal.    Resp:  No respiratory distress   Abdomen:      Soft, non-tender, non-distended  Musc:  Normal muscular tone    No major joint effusions    No asymmetric leg swelling  Skin:  No rash or lesions noted  Neuro: Speech is normal and fluent. Face is symmetric.     Difficulty  with raising the left lower extremity however able to do upper extremities without difficulty and against gravity, left side with abnormal movements that are intermittent, as were rolling around however with focus were able to do so with complete extraocular movements with no nystagmus, at times moving head all around in circles with focus her symptoms seem to improve, stuttering speech but no dysarthria or aphasia  Psych: Awake. Alert.  Normal affect.  Appropriate interactions.      Emergency Department Course   ECG:  Indication: chest pain   ECG taken at  1431, ECG read at 1434 by Dr. Desi Nguyễn MD  Sinus tachycardia  Otherwise normal ECG  Rate 104 bpm. AK interval 120. QRS duration 100. QT/QTc 320/420. P-R-T axes 55 4 64.    Imaging:  Radiology findings were communicated with the patient who voiced understanding of the findings.    CT head w/o contrast  IMPRESSION: No acute pathology, no bleed, mass, or acute infarcts.   Reading per radiology    XR chest port 1 view  IMPRESSION: No pleural fluid or pneumothorax. No airspace disease. There is mild vascular engorgement. The cardiomediastinal silhouette is at the upper limits of normal in size, and may be accentuated by technique.   Reading per radiology    MR brain w/o & w contrast  IMPRESSION:   1.  No evidence of acute infarction or other acute intracranial abnormality.   2.  Findings compatible with mild chronic small vessel ischemic change.   Reading per radiology     Laboratory:  Laboratory findings were communicated with the patient who voiced understanding of the findings.    CBC: AWNL (WBC 10.1, HGB 14.5, )  BMP: glucose 164(H), BUN 43(H), creatinine 1.27(H), GFR estimate 49(L)  Blood Culture x2: Pending  Lactic Acid (Resulted 1508): 1.7    UA with Microscopic: AWNL    Symptomatic COVID-19 virus (Coronavirus) by PCR: pending  SARS-CoV-2 COVID-19 virus (Coronavirus) RT-PCR: pending     Interventions:  1601 0.9% NaCl Bolus 1000 mL IV\  1610 Ativan 0.5 mg IV  1613 Gadavist 10 mL IV  1638 0.9% NaCl Bolus 1000 mL IV  1809 Toradol 15 mg IV  1857 Reglan 5 mg IV  1857 Benadryl 25 mg IV  1935 Ativan 0.5 mg IV  Emergency Department Course:  Past medical records, nursing notes, and vitals reviewed.    (1430)   I performed an exam of the patient as documented above.     EKG obtained in the ED, see results above.     IV was inserted and blood was drawn for laboratory testing, results above.    The patient was sent for a CT head w/o contrast while in the emergency department, results above.     A nasal swab was  obtained for laboratory testing, findings above.    The patient provided a urine sample here in the emergency department. This was sent for laboratory testing, findings above.     (1936)   I spoke with Dr. Ramirez of the Hospitalist service regarding patient's presentation, findings, and plan of care, who agrees to accept patient for further care, evaluation and monitoring.     Findings and plan explained to the Patient who consents to admission. Discussed the patient with Dr. Ramirez, who will admit the patient to a bed for further monitoring, evaluation, and treatment.    I personally reviewed the laboratory and imaging results with the Patient and answered all related questions prior to admission.     Impression & Plan   Medical Decision Making:  Camila Juarez is a 48 yo with history of fibromyalgia, anxiety and migraines who presents with multiple symptoms.  On arrival to the room she had very abnormal movements that seem to change with intent and focus.  She seemed to be fairly neurologically intact although had some weakness to her left lower extremity which she says is somewhat normal for her although it is worsened than usual.  She did not seem to be too overtly concerned about her abnormal movements and was talking through it.  She is completely alert and oriented.  She is afebrile.  I considered infectious etiology of her symptoms she had some shortness of breath so we did do some COVID swabbing.  This is pending at the time.  CT was done which was unremarkable.  Blood work was otherwise unremarkable with no leukocytosis.  With her abnormal movements I would be concerned about some chorea although I do not see any medications that would potentially cause this and she seems completely oriented and neurologically intact that I think a CNS infection would be unlikely.  She is also moving her neck with no difficulty at all.  I did give her 0.5 Ativan IV and this greatly helped her slurred speech.  When I  went to talk to her again she was still having headaches we gave her Toradol and then Reglan and Benadryl.  She then relayed to me as her slurred speech was much better than sometimes a degree of this will happen with her migraines but nothing this severe.  She does have a LORETO and I suspect this is because she is just been laying in bed.  Her stuttering and movements came back so we gave her another dose of Ativan.  Her headache is better but not and not gone.  She did have episodic low blood pressures but on recheck they would be in the 90s to 100s.  Her lactate is normal she is otherwise well-appearing with no infectious etiology of her symptoms so I doubt sepsis.  Regardless we will admit patient to [facility] for continued treatment of her headache and further work-up of these abnormal movements if they continue despite headache resolution.    Covid-19  Camila Juarez was evaluated during a global COVID-19 pandemic, which necessitated consideration that the patient might be at risk for infection with the SARS-CoV-2 virus that causes COVID-19.   Applicable protocols for evaluation were followed during the patient's care.   COVID-19 was considered as part of the patient's evaluation. The plan for testing is:  a test was obtained during this visit.    Diagnosis:    ICD-10-CM    1. Nonintractable headache, unspecified chronicity pattern, unspecified headache type  R51        Disposition:  Admitted to Dr. Dye.    Scribe Disclosure:  I, Herber Natarajan, am serving as a scribe at 2:23 PM on 6/21/2020 to document services personally performed by Desi Nguyễn MD based on my observations and the provider's statements to me.   6/21/2020   [facility] EMERGENCY DEPARTMENT       Desi Nguyễn MD  06/21/20 2031

## 2020-06-22 VITALS
SYSTOLIC BLOOD PRESSURE: 130 MMHG | BODY MASS INDEX: 35.45 KG/M2 | DIASTOLIC BLOOD PRESSURE: 58 MMHG | HEART RATE: 67 BPM | WEIGHT: 213 LBS | RESPIRATION RATE: 16 BRPM | TEMPERATURE: 99 F | OXYGEN SATURATION: 94 %

## 2020-06-22 LAB
ANION GAP SERPL CALCULATED.3IONS-SCNC: 4 MMOL/L (ref 3–14)
BASOPHILS # BLD AUTO: 0 10E9/L (ref 0–0.2)
BASOPHILS NFR BLD AUTO: 0.1 %
BUN SERPL-MCNC: 30 MG/DL (ref 7–30)
CALCIUM SERPL-MCNC: 7.8 MG/DL (ref 8.5–10.1)
CHLORIDE SERPL-SCNC: 107 MMOL/L (ref 94–109)
CO2 SERPL-SCNC: 27 MMOL/L (ref 20–32)
CREAT SERPL-MCNC: 0.8 MG/DL (ref 0.52–1.04)
DIFFERENTIAL METHOD BLD: NORMAL
EOSINOPHIL # BLD AUTO: 0 10E9/L (ref 0–0.7)
EOSINOPHIL NFR BLD AUTO: 0 %
ERYTHROCYTE [DISTWIDTH] IN BLOOD BY AUTOMATED COUNT: 11.9 % (ref 10–15)
GFR SERPL CREATININE-BSD FRML MDRD: 85 ML/MIN/{1.73_M2}
GLUCOSE BLDC GLUCOMTR-MCNC: 104 MG/DL (ref 70–99)
GLUCOSE BLDC GLUCOMTR-MCNC: 116 MG/DL (ref 70–99)
GLUCOSE BLDC GLUCOMTR-MCNC: 173 MG/DL (ref 70–99)
GLUCOSE SERPL-MCNC: 91 MG/DL (ref 70–99)
GLUCOSE SERPL-MCNC: 96 MG/DL (ref 70–99)
HCT VFR BLD AUTO: 38.3 % (ref 35–47)
HGB BLD-MCNC: 12.6 G/DL (ref 11.7–15.7)
IMM GRANULOCYTES # BLD: 0 10E9/L (ref 0–0.4)
IMM GRANULOCYTES NFR BLD: 0.3 %
INTERPRETATION ECG - MUSE: NORMAL
LYMPHOCYTES # BLD AUTO: 3.3 10E9/L (ref 0.8–5.3)
LYMPHOCYTES NFR BLD AUTO: 37 %
MCH RBC QN AUTO: 32 PG (ref 26.5–33)
MCHC RBC AUTO-ENTMCNC: 32.9 G/DL (ref 31.5–36.5)
MCV RBC AUTO: 97 FL (ref 78–100)
MONOCYTES # BLD AUTO: 0.9 10E9/L (ref 0–1.3)
MONOCYTES NFR BLD AUTO: 10.6 %
NEUTROPHILS # BLD AUTO: 4.6 10E9/L (ref 1.6–8.3)
NEUTROPHILS NFR BLD AUTO: 52 %
NRBC # BLD AUTO: 0 10*3/UL
NRBC BLD AUTO-RTO: 0 /100
PLATELET # BLD AUTO: 224 10E9/L (ref 150–450)
POTASSIUM SERPL-SCNC: 4 MMOL/L (ref 3.4–5.3)
RBC # BLD AUTO: 3.94 10E12/L (ref 3.8–5.2)
SODIUM SERPL-SCNC: 138 MMOL/L (ref 133–144)
WBC # BLD AUTO: 8.9 10E9/L (ref 4–11)

## 2020-06-22 PROCEDURE — 85025 COMPLETE CBC W/AUTO DIFF WBC: CPT | Performed by: HOSPITALIST

## 2020-06-22 PROCEDURE — 96375 TX/PRO/DX INJ NEW DRUG ADDON: CPT

## 2020-06-22 PROCEDURE — 25800030 ZZH RX IP 258 OP 636: Performed by: HOSPITALIST

## 2020-06-22 PROCEDURE — 25000132 ZZH RX MED GY IP 250 OP 250 PS 637: Mod: GY | Performed by: HOSPITALIST

## 2020-06-22 PROCEDURE — 80048 BASIC METABOLIC PNL TOTAL CA: CPT | Performed by: HOSPITALIST

## 2020-06-22 PROCEDURE — G0378 HOSPITAL OBSERVATION PER HR: HCPCS

## 2020-06-22 PROCEDURE — 40000893 ZZH STATISTIC PT IP EVAL DEFER: Performed by: PHYSICAL THERAPIST

## 2020-06-22 PROCEDURE — 36415 COLL VENOUS BLD VENIPUNCTURE: CPT | Performed by: HOSPITALIST

## 2020-06-22 PROCEDURE — 25000128 H RX IP 250 OP 636: Performed by: HOSPITALIST

## 2020-06-22 PROCEDURE — 00000146 ZZHCL STATISTIC GLUCOSE BY METER IP

## 2020-06-22 PROCEDURE — 99217 ZZC OBSERVATION CARE DISCHARGE: CPT | Performed by: HOSPITALIST

## 2020-06-22 RX ORDER — DIHYDROERGOTAMINE MESYLATE 4 MG/ML
1 SPRAY NASAL PRN
Qty: 1 ML | Refills: 0 | Status: SHIPPED | OUTPATIENT
Start: 2020-06-22

## 2020-06-22 RX ORDER — DIHYDROERGOTAMINE MESYLATE 1 MG/ML
1 INJECTION, SOLUTION INTRAMUSCULAR; INTRAVENOUS; SUBCUTANEOUS ONCE
Status: COMPLETED | OUTPATIENT
Start: 2020-06-22 | End: 2020-06-22

## 2020-06-22 RX ORDER — SODIUM CHLORIDE 9 MG/ML
INJECTION, SOLUTION INTRAVENOUS CONTINUOUS
Status: DISCONTINUED | OUTPATIENT
Start: 2020-06-22 | End: 2020-06-22 | Stop reason: HOSPADM

## 2020-06-22 RX ORDER — DIHYDROERGOTAMINE MESYLATE 4 MG/ML
1 SPRAY NASAL ONCE
Status: DISCONTINUED | OUTPATIENT
Start: 2020-06-22 | End: 2020-06-22 | Stop reason: RX

## 2020-06-22 RX ADMIN — ACETAMINOPHEN 650 MG: 325 TABLET, FILM COATED ORAL at 02:09

## 2020-06-22 RX ADMIN — SODIUM CHLORIDE: 9 INJECTION, SOLUTION INTRAVENOUS at 04:51

## 2020-06-22 RX ADMIN — GABAPENTIN 800 MG: 400 CAPSULE ORAL at 00:04

## 2020-06-22 RX ADMIN — SIMVASTATIN 80 MG: 40 TABLET, FILM COATED ORAL at 00:06

## 2020-06-22 RX ADMIN — DIHYDROERGOTAMINE MESYLATE 1 MG: 1 INJECTION, SOLUTION INTRAMUSCULAR; INTRAVENOUS; SUBCUTANEOUS at 14:28

## 2020-06-22 RX ADMIN — OXYCODONE HYDROCHLORIDE 10 MG: 10 TABLET, FILM COATED, EXTENDED RELEASE ORAL at 12:55

## 2020-06-22 RX ADMIN — GABAPENTIN 800 MG: 400 CAPSULE ORAL at 08:11

## 2020-06-22 RX ADMIN — BUSPIRONE HYDROCHLORIDE 10 MG: 10 TABLET ORAL at 08:11

## 2020-06-22 RX ADMIN — BUSPIRONE HYDROCHLORIDE 10 MG: 10 TABLET ORAL at 00:04

## 2020-06-22 RX ADMIN — MODAFINIL 200 MG: 200 TABLET ORAL at 08:13

## 2020-06-22 RX ADMIN — VENLAFAXINE HYDROCHLORIDE 225 MG: 75 CAPSULE, EXTENDED RELEASE ORAL at 08:11

## 2020-06-22 RX ADMIN — OXYCODONE HYDROCHLORIDE 10 MG: 10 TABLET, FILM COATED, EXTENDED RELEASE ORAL at 00:06

## 2020-06-22 RX ADMIN — TRAZODONE HYDROCHLORIDE 150 MG: 100 TABLET ORAL at 00:06

## 2020-06-22 NOTE — PHARMACY-ADMISSION MEDICATION HISTORY
Admission medication history interview status for this patient is complete. See [facility] admission navigator for allergy information, prior to admission medications and immunization status.     Medication history interview done via telephone during Covid-19 pandemic, indicate source(s): Family - sister Felisa  Medication history resources (including written lists, pill bottles, clinic record): Home med bottles, SureScripts and Care Everywhere    Changes made to PTA medication list:  Added: vitamin D2 (ran out and needs new Rx), pantoprazole, prazosin, trazodone  Deleted: clindamycin, docusate, liraglutide, omeprazole-sodium bicarb  Changed: buspirone 15mg TID --> 10mg BID. Gabapentin 1200mg TID --> 800mg QID. Lisinopril-hydrochlorothiazide 20-12.5mg daily --> lisinopril-hydrochlorothiazide 20-25mg daily. meloxicam 10mg --> 15mg daily. Lorazepam 0.5mg as needed --> 0.5mg every 6 hours prn. Modafinil 300mg daily --> 200mg daily. Venlafaxine XR 150mg daily --> XR 225mg daily.    Actions taken by pharmacist (provider contacted, etc): Called patient's sister to verify med list.     Additional medication history information: Patient has not taken medications for the past several days. Patient's sister thought she was having a once monthly diabetes injectable medication but there was no record-she was sure it was not the daily liraglutide. Patient was starting to have migraines again and was looking into a new Rx for migraine medication. Patient is out of Vitamin D2 prescription and needs a refill.     Medication reconciliation/reorder completed by provider prior to medication history?  N   (Y/N)     For patients on insulin therapy: N  (Y/N)    Prior to Admission medications    Medication Sig Last Dose Taking? Auth Provider   busPIRone (BUSPAR) 10 MG tablet Take 10 mg by mouth 2 times daily  Past Week at Unknown time Yes Reported, Patient   gabapentin (NEURONTIN) 800 MG tablet Take 800 mg by mouth 4 times daily Past Week at  Unknown time Yes Unknown, Entered By History   lisinopril-hydrochlorothiazide (ZESTORETIC) 20-25 MG tablet Take 1 tablet by mouth daily  Past Week at Unknown time Yes Reported, Patient   LORazepam (ATIVAN) 0.5 MG tablet Take 0.5 mg by mouth every 6 hours as needed for anxiety Past Month at Unknown time Yes Unknown, Entered By History   meloxicam (MOBIC) 15 MG tablet Take 15 mg by mouth daily  Past Week at Unknown time Yes Reported, Patient   modafinil (PROVIGIL) 200 MG tablet Take 200 mg by mouth daily  Past Week at Unknown time Yes Reported, Patient   oxyCODONE (XTAMPZA ER) 9 MG 12 hr tablet Take 9 mg by mouth every 12 hours Takes twice per day. Past Week at Unknown time Yes Reported, Patient   pantoprazole (PROTONIX) 40 MG EC tablet Take 40 mg by mouth daily as needed Past Month at Unknown time Yes Unknown, Entered By History   prazosin (MINIPRESS) 1 MG capsule Take 1 mg by mouth At Bedtime With 2mg capsule for total dose = 3mg Past Week at Unknown time Yes Unknown, Entered By History   prazosin (MINIPRESS) 2 MG capsule Take 2 mg by mouth At Bedtime With 1 mg capsule for total dose = 3mg Past Week at Unknown time Yes Unknown, Entered By History   simvastatin (ZOCOR) 80 MG tablet Take 80 mg by mouth At Bedtime  Past Week at Unknown time Yes Reported, Patient   traZODone (DESYREL) 150 MG tablet Take 150 mg by mouth At Bedtime Past Week at Unknown time Yes Unknown, Entered By History   venlafaxine (EFFEXOR-XR) 150 MG 24 hr capsule Take 150 mg by mouth daily With 75mg capsule for total dose = 225mg Past Week at Unknown time Yes Reported, Patient   venlafaxine (EFFEXOR-XR) 75 MG 24 hr capsule Take 75 mg by mouth daily With 150mg capsule for total dose = 225mg Past Week at Unknown time Yes Unknown, Entered By History   vitamin D2 (ERGOCALCIFEROL) 81170 units (1250 mcg) capsule Take 50,000 Units by mouth every 7 days On Mon 6/15/2020 Yes Unknown, Entered By History

## 2020-06-22 NOTE — PLAN OF CARE
PRIMARY DIAGNOSIS: ACUTE PAIN  OUTPATIENT/OBSERVATION GOALS TO BE MET BEFORE DISCHARGE:  1. Pain Status: Patient sleeping, has received scheduled oxycodone and prn tylenol po.    2. Return to near baseline physical activity: No, SBA x1 w/gb, pivots to commode, steady, tolerated activity well but reports dizziness.     3. Cleared for discharge by consultants (if involved): No, to be seen by therapy, neuro, psych, and social work.    Discharge Planner Nurse   Safe discharge environment identified: No, lives at home alone, has SW consulted.   Barriers to discharge: Yes       Entered by: Shanta Montano 06/22/2020 6:07 AM     Please review provider order for any additional goals.   Nurse to notify provider when observation goals have been met and patient is ready for discharge.      Patient Bps lower 80s-90s SBP, MD paged to inform about symptomatic BP, patient dizzy with activity, BP was taken while patient at rest. NS 0.9 started at 100/hr per order. LS clear, patient on RA.

## 2020-06-22 NOTE — DISCHARGE SUMMARY
[facility]  Hospitalist Discharge Summary      Date of Admission:  6/21/2020  Date of Discharge:  6/22/2020  4:44 PM  Discharging Provider: Simone Waters MD      Discharge Diagnoses   Migraine headache, involuntary movements    Follow-ups Needed After Discharge   Follow-up Appointments     Follow-up and recommended labs and tests       Follow up with primary care provider, Mor Pond, within 7 days   for hospital follow- up. Follow-up on your blood pressure. No follow up   labs or test are needed. [facility] and [facility]   carotid ultrasound             Unresulted Labs Ordered in the Past 30 Days of this Admission     Date and Time Order Name Status Description    6/21/2020 1455 Blood culture Preliminary     6/21/2020 1437 Blood culture Preliminary       These results will be followed up by Hospitalist group    Discharge Disposition   Discharged to home  Condition at discharge: Stable      Hospital Course   Camila Juarez is a 49 year old female who has a history of migraine headache, depression/anxiety, Essential hypertension, type 2 diabetes and other comorbidities as listed below. She presents with a bizarre cluster of symptoms of headache, slurred speech and uncontrolled body movements.  According to the patient a week ago on Saturday, she had a migraine headache crisis.  In consequence she had profound tiredness and spent most of the time in the next few days in bed and sleeping.  Today she has the onset of facial drooping, slurred speech and uncontrollable movement of her limbs for which she presented to the emergency department for an assessment.  It looks like in the past with migraine crisis she had had facial droop and slurred speech.  I discussed her case with Dr. Nguyễn the emergency physician, and in her opinion, the patient did very well with the use of Ativan in ED.  The patient herself was admitting to have high level of anxiety.  No symptoms of  "any acute infection documented pertaining to respiratory system, gastrointestinal or urinary.  Despite of a very thorough work-up, there are no findings of acute neurological insult to explain her presentation.  She has an acute kidney injury (likely prerenal) and elevation of glycemia but not any impressive finding in this preliminary work-up.  On my exam the patient is making movement of the head and neck side-to-side, that do not resemble any particular movement disorder.  At the same time, her speech is not exactly slurred, she is able to to communicate in full sentences such as: \"Am I going to have all my medications tonight?\"  \"Can I have ice chips?\"  \" I need to go potty\" But in between she makes   stuttering sounds.    Patient was admitted to the Obs Unit. Imaging here (CT head, MRI brain, CXR) were normal. Labs were normal. Patient was seen by Neurology who recommended outpt work-up. Patient did have some hypotension here. I have recommended she stop her prazosin and hydrochlorothiazide/lisinopril and follow-up with her BP with her PCP. I saw the patient multiple times today. When I saw her int he am, she did not have any abnormal movements. She had a 3/10 headache. She stated Migranal works for her but she did not have any due to insurance issues. I gave her an IM dose here. I also filled out the forms to try to have her insurance company cover this. After she was seen by Neuro, I saw the patient again. At this time she was having frequent movements of her head and arms. There were occasional movements of her legs but not as frequent. I explained the plan for her (including referral to the Movement Disorders Neurology Dept at the  and outpt carotid US. I explained I completed the on-line form for her insurance to cover Migranal. I called her sister and updated her. Patient discharged home.     Consultations This Hospital Stay   SOCIAL WORK IP CONSULT  CARE COORDINATOR IP CONSULT  PHYSICAL THERAPY ADULT IP " CONSULT  OCCUPATIONAL THERAPY ADULT IP CONSULT  PSYCHIATRY IP CONSULT  NEUROLOGY IP CONSULT  NEUROLOGY IP CONSULT    Code Status   Full Code    Time Spent on this Encounter   I, Simone Waters MD, personally saw the patient today and spent greater than 30 minutes discharging this patient.       Simone Waters MD  [facility]  ______________________________________________________________________    Physical Exam   Vital Signs: Temp: 99  F (37.2  C) Temp src: Oral BP: 130/58 Pulse: 67 Heart Rate: 74 Resp: 16 SpO2: 94 % O2 Device: None (Room air)    Weight: 213 lbs 0 oz  Constitutional: awake, alert, cooperative, no apparent distress, and appears stated age  Eyes: Lids and lashes normal, pupils equal, round and reactive to light, extra ocular muscles intact, sclera clear, conjunctiva normal  ENT: Normocephalic, without obvious abnormality, atraumatic, sinuses nontender on palpation, external ears without lesions, oral pharynx with moist mucous membranes, tonsils without erythema or exudates, gums normal and good dentition.  Respiratory: No increased work of breathing, good air exchange, clear to auscultation bilaterally, no crackles or wheezing  Cardiovascular: Normal apical impulse, regular rate and rhythm, normal S1 and S2, no S3 or S4, and no murmur noted  GI: No scars, normal bowel sounds, soft, non-distended, non-tender, no masses palpated, no hepatosplenomegally  Skin: no bruising or bleeding   she was having frequent movements of her head and arms. There were occasional movements of her legs but not as frequent   Primary Care Physician   Mor Pond    Discharge Orders      US Carotid Bilateral     NEUROLOGY ADULT REFERRAL      Reason for your hospital stay    Migraines. Possible movement disorder. You were seen by Neurology who has recommended follow-up with a [facility] at the [facility] if your movements continue. This referral has been placed. He also recommended an  outpatient carotid ultrasound. This order has been placed and you should be contacted to schedule this.     Activity    Your activity upon discharge: activity as tolerated     Follow-up and recommended labs and tests     Follow up with primary care provider, Mor Pond, within 7 days for hospital follow- up. Follow-up on your blood pressure. No follow up labs or test are needed. Follow-up with [facility] and outpatient carotid ultrasound     Diet    Follow this diet upon discharge: Orders Placed This Encounter      Moderate Consistent CHO Diet       Significant Results and Procedures   Most Recent 3 CBC's:  Recent Labs   Lab Test 06/22/20  0616 06/21/20  1455 04/21/20  2046   WBC 8.9 10.1 11.6*   HGB 12.6 14.5 14.4   MCV 97 95 97    271 315     Most Recent 3 BMP's:  Recent Labs   Lab Test 06/22/20  0616 06/21/20 2252 06/21/20 1455 04/21/20  2046     --  134 137   POTASSIUM 4.0  --  3.8 3.8   CHLORIDE 107  --  100 104   CO2 27  --  26 28   BUN 30  --  43* 24   CR 0.80  --  1.27* 0.63   ANIONGAP 4  --  8 5   VIOLET 7.8*  --  8.6 9.0   GLC 96 91 164* 109*     Most Recent 2 LFT's:  Recent Labs   Lab Test 06/21/20  1455 05/05/15  1817   AST 19 10   ALT 25 30   ALKPHOS 69 78   BILITOTAL 0.2 0.2   ,   Results for orders placed or performed during the hospital encounter of 06/21/20   CT Head w/o Contrast    Narrative    CT SCAN OF THE HEAD WITHOUT CONTRAST   6/21/2020 3:31 PM     HISTORY: shaking, headache    TECHNIQUE:  Axial images of the head and coronal reformations without  IV contrast material. Radiation dose for this scan was reduced using  automated exposure control, adjustment of the mA and/or kV according  to patient size, or iterative reconstruction technique.    COMPARISON: None.    FINDINGS:     Intracranial contents: The ventricles are normal in size, shape and  configuration.  The brain parenchyma and subarachnoid spaces are  normal. There is no evidence of intracranial hemorrhage,  mass, acute  infarct or anomaly.    Visualized orbits/sinuses/mastoids:  The visualized portions of the  sinuses and mastoids appear normal.    Osseous structures/soft tissues:  There is no evidence of trauma.      Impression    IMPRESSION: No acute pathology, no bleed, mass, or acute infarcts.      ETHAN LAU MD   MR Brain w/o & w Contrast    Narrative    EXAM: MR BRAIN W/O and W CONTRAST  LOCATION: [facility]  DATE/TIME: 6/21/2020 4:12 PM    INDICATION: Left leg weakness.  COMPARISON: Head CT 06/21/2020.  CONTRAST: 10 mL Gadavist.  TECHNIQUE: Routine multiplanar multisequence head MRI without and with intravenous contrast.    FINDINGS:  INTRACRANIAL CONTENTS: No acute or subacute infarct. No mass, acute hemorrhage, or extra-axial fluid collections. Scattered nonspecific T2/FLAIR hyperintensities within the cerebral white matter most consistent with mild chronic microvascular ischemic   change. Normal ventricles and sulci. Normal position of the cerebellar tonsils. No pathologic contrast enhancement. The major intracranial vascular flow voids are maintained.    SELLA: No abnormality accounting for technique.    OSSEOUS STRUCTURES/SOFT TISSUES: Normal marrow signal. Unremarkable extracranial soft tissues. Limited upper cervical spine is grossly unremarkable, within the limitations of motion artifact.    ORBITS: No abnormality accounting for technique.     SINUSES/MASTOIDS: No paranasal sinus mucosal disease. No middle ear or mastoid effusion.       Impression    IMPRESSION:  1.  No evidence of acute infarction or other acute intracranial abnormality.  2.  Findings compatible with mild chronic small vessel ischemic change.       XR Chest Port 1 View    Narrative    EXAM: XR CHEST PORT 1 VW  LOCATION: [facility]  DATE/TIME: 6/21/2020 6:13 PM    INDICATION: Chest pain   COMPARISON: 04/21/2020       Impression    IMPRESSION: No pleural fluid or pneumothorax. No airspace disease. There is mild  vascular engorgement. The cardiomediastinal silhouette is at the upper limits of normal in size, and may be accentuated by technique.        Discharge Medications   Current Discharge Medication List      START taking these medications    Details   dihydroergotamine (MIGRANAL) 4 MG/ML nasal spray Spray 1 spray into both nostrils as needed for migraine Use in one nostril as directed.  No more than 4 sprays in one hour.  Qty: 1 mL, Refills: 0    Associated Diagnoses: Other migraine without status migrainosus, not intractable         CONTINUE these medications which have NOT CHANGED    Details   busPIRone (BUSPAR) 10 MG tablet Take 10 mg by mouth 2 times daily       gabapentin (NEURONTIN) 800 MG tablet Take 800 mg by mouth 4 times daily      LORazepam (ATIVAN) 0.5 MG tablet Take 0.5 mg by mouth every 6 hours as needed for anxiety      meloxicam (MOBIC) 15 MG tablet Take 15 mg by mouth daily       modafinil (PROVIGIL) 200 MG tablet Take 200 mg by mouth daily       oxyCODONE (XTAMPZA ER) 9 MG 12 hr tablet Take 9 mg by mouth every 12 hours Takes twice per day.      pantoprazole (PROTONIX) 40 MG EC tablet Take 40 mg by mouth daily as needed      simvastatin (ZOCOR) 80 MG tablet Take 80 mg by mouth At Bedtime       traZODone (DESYREL) 150 MG tablet Take 150 mg by mouth At Bedtime      !! venlafaxine (EFFEXOR-XR) 150 MG 24 hr capsule Take 150 mg by mouth daily With 75mg capsule for total dose = 225mg      !! venlafaxine (EFFEXOR-XR) 75 MG 24 hr capsule Take 75 mg by mouth daily With 150mg capsule for total dose = 225mg      vitamin D2 (ERGOCALCIFEROL) 64937 units (1250 mcg) capsule Take 50,000 Units by mouth every 7 days On Mon       !! - Potential duplicate medications found. Please discuss with provider.      STOP taking these medications       lisinopril-hydrochlorothiazide (ZESTORETIC) 20-25 MG tablet Comments:   Reason for Stopping:         prazosin (MINIPRESS) 1 MG capsule Comments:   Reason for Stopping:          prazosin (MINIPRESS) 2 MG capsule Comments:   Reason for Stopping:             Allergies   Allergies   Allergen Reactions     Penicillins Anaphylaxis     Patient previously tolerated cephalosporins and Amoxicillin/Augmentin per patient report after PCN reaction     Compazine [Prochlorperazine]      Morphine Hives     Sulfa Drugs      Tramadol

## 2020-06-22 NOTE — PLAN OF CARE
PRIMARY DIAGNOSIS: ACUTE PAIN  OUTPATIENT/OBSERVATION GOALS TO BE MET BEFORE DISCHARGE:  1. Pain Status: Pain c/o 6/10 aching pain in head and back, oxycodone given, Tylenol given.    2. Return to near baseline physical activity: No, SBA x1 w/gb    3. Cleared for discharge by consultants (if involved): No, to be seen by therapy, neuro, psych, and social work.    Discharge Planner Nurse   Safe discharge environment identified: No, lives at home alone, has SW consulted.   Barriers to discharge: Yes       Entered by: Shanta Montano 06/22/2020 2:53 AM     Please review provider order for any additional goals.   Nurse to notify provider when observation goals have been met and patient is ready for discharge.      Patient observed not shaking/being steady and being able to drink fluids independently, on phone texting. Patient asked if she feels less shakey, she stated yes, but now is lightheaded. Patient instructed to call to get up, bed alarm remains on. Patient up to commode with SBAx1. Neuros intact with intermittent weakness in L arm and leg. Pupils equal and reactive to light. Patient is alert and oriented in room. Will continue to monitor pain and patient neuro ability.

## 2020-06-22 NOTE — PLAN OF CARE
PRIMARY DIAGNOSIS: ACUTE PAIN  OUTPATIENT/OBSERVATION GOALS TO BE MET BEFORE DISCHARGE:  1. Pain Status: Improved. Pt is on scheduled chronic pain meds.    2. Return to near baseline physical activity: No    3. Cleared for discharge by consultants (if involved): No    Discharge Planner Nurse   Safe discharge environment identified: Yes  Barriers to discharge: Yes       Entered by: Joanne Dobson 06/22/2020 10:40 AM     Please review provider order for any additional goals.   Nurse to notify provider when observation goals have been met and patient is ready for discharge.    Pt A&O, left side strength is strong, but slightly weaker. BP soft, otherwise VSS. LS clear on RA, denies SOB. Active BS. Passing gas, denies nausea. Voiding in BSC. Up SBA, GB. On mod carb diet. C/O HA 2-3/10, has chronic pain meds.   .  Plan: neurology, psych, PT/OT/SW  BP (!) 88/40 (BP Location: Right arm)   Pulse 67   Temp 96.8  F (36  C) (Oral)   Resp 13   Wt 96.6 kg (213 lb)   SpO2 95%   BMI 35.45 kg/m

## 2020-06-22 NOTE — H&P
"[facility]    History and Physical  Hospitalist     Date of Admission:  6/21/2020  Date of Service (when I saw the patient): 06/21/20  Provider: Waqar Ramirez MD      Chief Complaint   Headache     History is obtained from the patient, electronic health record and emergency department physician    History of Present Illness   Camila Juarez is a 49 year old female who has a history of migraine headache, depression/anxiety, Essential hypertension, type 2 diabetes and other comorbidities as listed below. She presents with a bizarre cluster of symptoms of headache, slurred speech and uncontrolled body movements.  According to the patient a week ago on Saturday, she had a migraine headache crisis.  In consequence she had profound tiredness and spent most of the time in the next few days in bed and sleeping.  Today she has the onset of facial drooping, slurred speech and uncontrollable movement of her limbs for which she presented to the emergency department for an assessment.  It looks like in the past with migraine crisis she had had facial droop and slurred speech.  I discussed her case with Dr. Nguyễn the emergency physician, and in her opinion, the patient did very well with the use of Ativan in ED.  The patient herself was admitting to have high level of anxiety.  No symptoms of any acute infection documented pertaining to respiratory system, gastrointestinal or urinary.  Despite of a very thorough work-up, there are no findings of acute neurological insult to explain her presentation.  She has an acute kidney injury (likely prerenal) and elevation of glycemia but not any impressive finding in this preliminary work-up.  On my exam the patient is making movement of the head and neck side-to-side, that do not resemble any particular movement disorder.  At the same time, her speech is not exactly slurred, she is able to to communicate in full sentences such as: \"Am I going to have all my " "medications tonight?\"  \"Can I have ice chips?\"  \" I need to go potty\" But in between she makes   stuttering sounds.    Vital signs:  Temp: 98.1  F (36.7  C) Temp src: Oral BP: 101/62 Pulse: 68 Heart Rate: 68 Resp: 11 SpO2: 95 % O2 Device: None (Room air)     Weight: 96.6 kg (213 lb)  Estimated body mass index is 35.45 kg/m  as calculated from the following:  Height as of 8/11/17: 1.651 m (5' 5\").  Weight as of this encounter: 96.6 kg (213 lb).    Laboratory work-up:  CBC with differential is normal.  Chemistry except for BUN 43, creatinine 1.27, glomerular filtration rate 49 other values are within normal limit.  Lactic acid 1.7.  Glycemia 169.  UA is normal  COVID-19 by PCR test is in process  EKG with sinus tachycardia, heart rate 104.    Chest x-ray.  IMPRESSION: No pleural fluid or pneumothorax. No airspace disease. There is mild vascular engorgement. The cardiomediastinal silhouette is at the upper limits of normal in size, and may be accentuated by technique    CT of the head without.  IMPRESSION: No acute pathology, no bleed, mass, or acute infarcts.    MRI of the brain.  IMPRESSION:  1.  No evidence of acute infarction or other acute intracranial abnormality.  2.  Findings compatible with mild chronic small vessel ischemic change.     Past Medical History    I have reviewed this patient's medical history and updated it with pertinent information if needed.   Past Medical History:   Diagnosis Date     Anxiety      Back pain      Depressive disorder      Diabetes (H)      Endometriosis      Fibromyalgia      Gastroesophageal reflux disease      Hypertension      Migraine      Rheumatoid arthritis with rheumatoid factor (H)      Assessment & Plan   Camila Juarez is a 49 year old female who presents with headache and uncontrolled body movements, high level of anxiety and an overall non revealing thorough work up performed in ED.     1.  Headache.  She is reporting tonight headache in the nape and occipital " region.  Very peculiar pattern for migraine, in my opinion, she currently is having headache but no migraine.  All these apparently started with a migraine 7 days ago.   2.  LORETO.  Suspect prerenal from low oral intake the days she has a spent in bed in the last week.  I will check Shakila and osmolalities.  She is going to be hydrated overnight.  Recheck BMP in a.m.  3.  Uncontrolled head-neck-body movements.  Her presentation does not fit in a neurological syndrome in my opinion, I suspect psychosomatic background with conversion.  I am going to consult neurology and psychiatry.  4.  Essential hypertension.  She is normotensive we will continue her home medication.  5.  GERD.  Asymptomatic.  Continue home medication.  6.  T2 Diabetes mellitus.   Last A1c 6.5.  PTA medication Victoza 18 mg subcutaneous daily.  Moderate CHO diet.  Medium a sliding scale.  Recheck A1c.  7.  Chronic pain syndrome on opioid medication.   Oxycodone ER 9 mg every 12 hour as prior to admission  Gabapentin 200 mg 3 times daily.  8.  Depression/anxiety.  Ativan 0.5 mg 1 tablet every 6 hours as needed for anxiety.  BuSpar 50 mg 1 tablet 3 times daily.  Effexor  mg 1 capsule daily.  9.  History of migraine.  10.  Admit to observation      Code Status   Full Code    Primary Care Physician   Mor Pond      Past Surgical History   I have reviewed this patient's surgical history and updated it with pertinent information if needed.  Past Surgical History:   Procedure Laterality Date     BACK SURGERY       GYN SURGERY      hysterectomy     HYSTERECTOMY       LAPAROSCOPIC APPENDECTOMY N/A 3/27/2015    Procedure: LAPAROSCOPIC APPENDECTOMY;  Surgeon: Ryan Fiore MD;  Location: [facility] OR     ORTHOPEDIC SURGERY         Prior to Admission Medications   Prior to Admission Medications   Prescriptions Last Dose Informant Patient Reported? Taking?   LORAZEPAM PO   Yes No   Sig: Take 0.5 mg by mouth as needed    Meloxicam 10 MG CAPS   Yes  No   Omeprazole-Sodium Bicarbonate  MG CAPS   Yes No   Sig: Take 1 capsule by mouth every morning    busPIRone (BUSPAR) 15 MG tablet   Yes No   Sig: Take 15 mg by mouth 3 times daily   clindamycin (CLEOCIN) 300 MG capsule   Yes No   Sig: Take 300 mg by mouth 4 times daily   docusate sodium (COLACE) 100 MG capsule   No No   Sig: Take 1 capsule (100 mg) by mouth 2 times daily as needed for constipation   gabapentin (NEURONTIN) 400 MG capsule  Self Yes No   Sig: Take 1,200 mg by mouth 3 times daily   liraglutide (VICTOZA) 18 MG/3ML solution   Yes No   Sig: Inject 1.8 mg Subcutaneous daily   lisinopril-hydrochlorothiazide (PRINZIDE/ZESTORETIC) 20-12.5 MG tablet   Yes No   Sig: Take 1 tablet by mouth daily   modafinil (PROVIGIL) 200 MG tablet   Yes No   Sig: Take 300 mg by mouth daily   oxyCODONE (XTAMPZA ER) 9 MG 12 hr tablet   Yes No   Sig: Take 9 mg by mouth every 12 hours Takes twice per day.   simvastatin (ZOCOR) 80 MG tablet   Yes No   Sig: Take by mouth At Bedtime   venlafaxine (EFFEXOR-XR) 150 MG 24 hr capsule   Yes No   Sig: Take 150 mg by mouth daily      Facility-Administered Medications: None     Allergies   Allergies   Allergen Reactions     Penicillins Anaphylaxis     Patient previously tolerated cephalosporins and Amoxicillin/Augmentin per patient report after PCN reaction     Compazine [Prochlorperazine]      Morphine Hives     Sulfa Drugs      Tramadol        Social History   I have personally reviewed the social history with the patient showing.  Social History     Tobacco Use     Smoking status: Current Every Day Smoker     Packs/day: 0.25     Smokeless tobacco: Never Used   Substance Use Topics     Alcohol use: No     Family History   I have reviewed this patient's family history and it is not contributory to the admission .       Review of Systems   Except as noted in the HPI, a 12-system Review of Systems was found to be negative.      Physical Exam   Vital Signs with Ranges  Temp:  [98.1  F  (36.7  C)-99.1  F (37.3  C)] 98.1  F (36.7  C)  Pulse:  [68-90] 68  Heart Rate:  [] 71  Resp:  [14-20] 18  BP: ()/(46-72) 96/53  SpO2:  [92 %-99 %] 95 %  213 lbs 0 oz    GEN:  Alert, comunicates, mixing of stuttering and normal speech, appears comfortable, NAD.  HEENT:  Normocephalic/atraumatic, no scleral icterus, no nasal discharge, mouth moist.  CV:  Regular rate and rhythm, no murmur or JVD.  S1 + S2 noted, no S3 or S4.  LUNGS:  Clear to auscultation bilaterally without rales/rhonchi/wheezing/retractions.  Symmetric chest rise on inhalation noted.  ABD:  Active bowel sounds, soft, non-tender/non-distended.  No rebound/guarding/rigidity.  EXT:  No edema or cyanosis.  No joint synovitis noted.  SKIN:  Dry to touch, no exanthems noted in the visualized areas.  NEURO; Facial symmetry, EOMI, moving all 4 limbs.     Data   I personally reviewed the EKG tracing showing Sinus tachycardia 104.  Results for orders placed or performed during the hospital encounter of 06/21/20 (from the past 24 hour(s))   EKG 12-lead, tracing only   Result Value Ref Range    Interpretation ECG Click View Image link to view waveform and result    CBC with platelets differential   Result Value Ref Range    WBC 10.1 4.0 - 11.0 10e9/L    RBC Count 4.56 3.8 - 5.2 10e12/L    Hemoglobin 14.5 11.7 - 15.7 g/dL    Hematocrit 43.5 35.0 - 47.0 %    MCV 95 78 - 100 fl    MCH 31.8 26.5 - 33.0 pg    MCHC 33.3 31.5 - 36.5 g/dL    RDW 12.0 10.0 - 15.0 %    Platelet Count 271 150 - 450 10e9/L    Diff Method Automated Method     % Neutrophils 64.8 %    % Lymphocytes 25.3 %    % Monocytes 9.2 %    % Eosinophils 0.0 %    % Basophils 0.2 %    % Immature Granulocytes 0.5 %    Nucleated RBCs 0 0 /100    Absolute Neutrophil 6.5 1.6 - 8.3 10e9/L    Absolute Lymphocytes 2.6 0.8 - 5.3 10e9/L    Absolute Monocytes 0.9 0.0 - 1.3 10e9/L    Absolute Eosinophils 0.0 0.0 - 0.7 10e9/L    Absolute Basophils 0.0 0.0 - 0.2 10e9/L    Abs Immature Granulocytes 0.1 0 -  0.4 10e9/L    Absolute Nucleated RBC 0.0    Basic metabolic panel   Result Value Ref Range    Sodium 134 133 - 144 mmol/L    Potassium 3.8 3.4 - 5.3 mmol/L    Chloride 100 94 - 109 mmol/L    Carbon Dioxide 26 20 - 32 mmol/L    Anion Gap 8 3 - 14 mmol/L    Glucose 164 (H) 70 - 99 mg/dL    Urea Nitrogen 43 (H) 7 - 30 mg/dL    Creatinine 1.27 (H) 0.52 - 1.04 mg/dL    GFR Estimate 49 (L) >60 mL/min/[1.73_m2]    GFR Estimate If Black 57 (L) >60 mL/min/[1.73_m2]    Calcium 8.6 8.5 - 10.1 mg/dL   Lactic acid whole blood   Result Value Ref Range    Lactic Acid 1.7 0.7 - 2.0 mmol/L   Blood culture    Specimen: Blood    Left Arm   Result Value Ref Range    Specimen Description Blood Left Arm     Culture Micro No growth after 1 hour    CT Head w/o Contrast    Narrative    CT SCAN OF THE HEAD WITHOUT CONTRAST   6/21/2020 3:31 PM     HISTORY: shaking, headache    TECHNIQUE:  Axial images of the head and coronal reformations without  IV contrast material. Radiation dose for this scan was reduced using  automated exposure control, adjustment of the mA and/or kV according  to patient size, or iterative reconstruction technique.    COMPARISON: None.    FINDINGS:     Intracranial contents: The ventricles are normal in size, shape and  configuration.  The brain parenchyma and subarachnoid spaces are  normal. There is no evidence of intracranial hemorrhage, mass, acute  infarct or anomaly.    Visualized orbits/sinuses/mastoids:  The visualized portions of the  sinuses and mastoids appear normal.    Osseous structures/soft tissues:  There is no evidence of trauma.      Impression    IMPRESSION: No acute pathology, no bleed, mass, or acute infarcts.      ETHAN LAU MD   Symptomatic COVID-19 Virus (Coronavirus) by PCR    Specimen: Nasopharyngeal   Result Value Ref Range    COVID-19 Virus PCR to [facility] - Source Nasopharyngeal     COVID-19 Virus PCR to [facility] - Result       Test received-See reflex to IDDL test SARS CoV2 (COVID-19) Virus  RT-PCR   SARS-CoV-2 COVID-19 Virus (Coronavirus) RT-PCR Nasopharyngeal    Specimen: Nasopharyngeal   Result Value Ref Range    SARS-CoV-2 Virus Specimen Source Nasopharyngeal     SARS-CoV-2 PCR Result PENDING     SARS-CoV-2 PCR Comment PENDING    MR Brain w/o & w Contrast    Narrative    EXAM: MR BRAIN W/O and W CONTRAST  LOCATION: [facility]  DATE/TIME: 6/21/2020 4:12 PM    INDICATION: Left leg weakness.  COMPARISON: Head CT 06/21/2020.  CONTRAST: 10 mL Gadavist.  TECHNIQUE: Routine multiplanar multisequence head MRI without and with intravenous contrast.    FINDINGS:  INTRACRANIAL CONTENTS: No acute or subacute infarct. No mass, acute hemorrhage, or extra-axial fluid collections. Scattered nonspecific T2/FLAIR hyperintensities within the cerebral white matter most consistent with mild chronic microvascular ischemic   change. Normal ventricles and sulci. Normal position of the cerebellar tonsils. No pathologic contrast enhancement. The major intracranial vascular flow voids are maintained.    SELLA: No abnormality accounting for technique.    OSSEOUS STRUCTURES/SOFT TISSUES: Normal marrow signal. Unremarkable extracranial soft tissues. Limited upper cervical spine is grossly unremarkable, within the limitations of motion artifact.    ORBITS: No abnormality accounting for technique.     SINUSES/MASTOIDS: No paranasal sinus mucosal disease. No middle ear or mastoid effusion.       Impression    IMPRESSION:  1.  No evidence of acute infarction or other acute intracranial abnormality.  2.  Findings compatible with mild chronic small vessel ischemic change.       UA with Microscopic   Result Value Ref Range    Color Urine Straw     Appearance Urine Clear     Glucose Urine Negative NEG^Negative mg/dL    Bilirubin Urine Negative NEG^Negative    Ketones Urine Negative NEG^Negative mg/dL    Specific Gravity Urine 1.011 1.003 - 1.035    Blood Urine Negative NEG^Negative    pH Urine 5.0 5.0 - 7.0 pH    Protein  Albumin Urine Negative NEG^Negative mg/dL    Urobilinogen mg/dL Normal 0.0 - 2.0 mg/dL    Nitrite Urine Negative NEG^Negative    Leukocyte Esterase Urine Negative NEG^Negative    Source Catheterized Urine     WBC Urine 0 0 - 5 /HPF    RBC Urine 0 0 - 2 /HPF    Squamous Epithelial /HPF Urine <1 0 - 1 /HPF   XR Chest Port 1 View    Narrative    EXAM: XR CHEST PORT 1 VW  LOCATION: [facility]  DATE/TIME: 6/21/2020 6:13 PM    INDICATION: Chest pain   COMPARISON: 04/21/2020       Impression    IMPRESSION: No pleural fluid or pneumothorax. No airspace disease. There is mild vascular engorgement. The cardiomediastinal silhouette is at the upper limits of normal in size, and may be accentuated by technique.        Disclaimer: This note consists of symbols derived from keyboarding, dictation and/or voice recognition software. As a result, there may be errors in the script that have gone undetected. Please consider this when interpreting information found in this chart.

## 2020-06-22 NOTE — ED NOTES
[facility]  ED Nurse Handoff Report    Camila Juarez is a 49 year old female   ED Chief complaint: Shaking; Chest Pain; and Aphasia  . ED Diagnosis:   Final diagnoses:   None     Allergies:   Allergies   Allergen Reactions     Penicillins Anaphylaxis     Patient previously tolerated cephalosporins and Amoxicillin/Augmentin per patient report after PCN reaction     Compazine [Prochlorperazine]      Morphine Hives     Sulfa Drugs      Tramadol        Code Status: Full Code  Activity level - Baseline/Home:  Independent. Activity Level - Current:   Assist X 2. Lift room needed: No. Bariatric: No   Needed: No   Isolation: yes. Infection: r/o COVID.     Vital Signs:   Vitals:    06/21/20 1750 06/21/20 1800 06/21/20 1815 06/21/20 1830   BP:  (!) 82/56 (!) 87/51 105/64   Pulse:  75  75   Resp: 15 14 15 18   Temp:       TempSrc:       SpO2: 93% 93% 92% 94%   Weight:           Cardiac Rhythm:  ,      Pain level: 0-10 Pain Scale: 9  Patient confused: No. Patient Falls Risk: Yes.   Elimination Status: Has voided   Patient Report - Initial Complaint: Patient here with shaking, restlessness and stuttering speech for 1.5 weeks and getting worse. Expressive aphasia in triage, Her sister is helping with interview in triage. She has left eye droopiness since last week, on and off. She had fever/chills/shortness of breath last Sunday and Monday with continued shortness of breath when walking.      Sister Felisa [phone]. Focused Assessment: Cognitive - Cognitive/Neuro/Behavioral WDL: -WDL except; all  Level Of Consciousness: alert  Arousal Level: opens eyes spontaneously  Orientation: oriented x 4  Follows Commands: yes  Speech:  (heavy stuttering and difficulty getting words out) Best Language: 0 - No aphasia  Mood/Behavior: calm; cooperative   [place] Coma Scale - Best Eye Response: 4-->(E4) spontaneous  Best Motor Response: 6-->(M6) obeys commands  Best Verbal Response: 5-->(V5) oriented  Angel  Coma Scale Score: 15   Motor Strength - Left Upper: 2 - active movement of body part when gravity is eliminated  Right Upper: 5 - active movement against gravity and full resistance  Left Lower: 3 - active movement against gravity  Right Lower: 5 - active movement against gravity and full resistance   Sensory Assessment - Sensation - All Extremities: no impairment    Tests Performed: labs, CT, MRI, UA, EKG. Abnormal Results:   Labs Ordered and Resulted from Time of ED Arrival Up to the Time of Departure from the ED   BASIC METABOLIC PANEL - Abnormal; Notable for the following components:       Result Value    Glucose 164 (*)     Urea Nitrogen 43 (*)     Creatinine 1.27 (*)     GFR Estimate 49 (*)     GFR Estimate If Black 57 (*)     All other components within normal limits   CBC WITH PLATELETS DIFFERENTIAL   COVID-19 VIRUS (CORONAVIRUS) BY PCR   LACTIC ACID WHOLE BLOOD   SARS-COV-2 (COVID-19) VIRUS RT-PCR   ROUTINE UA WITH MICROSCOPIC   BLOOD CULTURE   BLOOD CULTURE     XR Chest Port 1 View   Final Result   IMPRESSION: No pleural fluid or pneumothorax. No airspace disease. There is mild vascular engorgement. The cardiomediastinal silhouette is at the upper limits of normal in size, and may be accentuated by technique.       MR Brain w/o & w Contrast   Final Result   IMPRESSION:   1.  No evidence of acute infarction or other acute intracranial abnormality.   2.  Findings compatible with mild chronic small vessel ischemic change.            CT Head w/o Contrast   Final Result   IMPRESSION: No acute pathology, no bleed, mass, or acute infarcts.         ETHAN LAU MD        .   Treatments provided: IV fluids, migraine cocktail  Family Comments: spoke with sister, Felisa, at 1900 and updated on plan of care  OBS brochure/video discussed/provided to patient:  Yes- offered but pt refused   ED Medications:   Medications   0.9% sodium chloride BOLUS (0 mLs Intravenous Stopped 6/21/20 0455)     Followed by   sodium chloride  0.9% infusion (has no administration in time range)   LORazepam (ATIVAN) injection 0.5 mg (0.5 mg Intravenous Given 6/21/20 1610)   gadobutrol (GADAVIST) injection 10 mL (10 mLs Intravenous Given 6/21/20 1613)   0.9% sodium chloride BOLUS (0 mLs Intravenous Stopped 6/21/20 1838)   ketorolac (TORADOL) injection 15 mg (15 mg Intravenous Given 6/21/20 1809)   metoclopramide (REGLAN) injection 5 mg (5 mg Intravenous Given 6/21/20 1857)   diphenhydrAMINE (BENADRYL) injection 25 mg (25 mg Intravenous Given 6/21/20 1857)     Drips infusing:  No  For the majority of the shift, the patient's behavior Green. Interventions performed were none.    Sepsis treatment initiated: No       ED Nurse Name/Phone Number: Ave Flynn RN,   7:19 PM    RECEIVING UNIT ED HANDOFF REVIEW    Above ED Nurse Handoff Report was reviewed: Yes  Reviewed by: Shanta Montano RN on June 21, 2020 at 10:36 PM

## 2020-06-22 NOTE — PLAN OF CARE
Discharge Planner PT   Patient plan for discharge: Home  Current status: Pt up SBA with no assistive device. Limited by dizziness/low BP with mobility. Per MD no IP PT needs at this time.   Barriers to return to prior living situation: None  Recommendations for discharge: Home  Rationale for recommendations: Once medically stable, anticipate discharge home per MD.        Entered by: Aljeandra Lee 06/22/2020 2:24 PM

## 2020-06-22 NOTE — PROVIDER NOTIFICATION
Patient Bps lower, 89/71 on L arm and 98/38 on R arm, other vitals stable, Patient c/o dizziness when she was up to commode earlier, vitals taken while laying at rest, MD paged. NS 0.9 ordered for 100ml/hr.

## 2020-06-22 NOTE — CONSULTS
Brief SW note:    SW and RN CC consulted for discharge planning. Per care team rounds, no SW or RN CC needs identified. Please re-consult if needs arise.     NOÉ Calero   Inpatient Care Coordination  [facility]   [phone]

## 2020-06-22 NOTE — PLAN OF CARE
ROOM # 222-1    Living Situation (if not independent, order SW consult): Home alone  : Felisa finney    Activity level at baseline: Ind.   Activity level on admit: Ax1 pivot      Patient registered to observation; given Patient Bill of Rights; given the opportunity to ask questions about observation status and their plan of care.  Patient has been oriented to the observation room, bathroom and call light is in place.    Discussed discharge goals and expectations with patient/family.

## 2020-06-22 NOTE — PLAN OF CARE
Patient's After Visit Summary was reviewed with patient.   Patient verbalized understanding of After Visit Summary, recommended follow up and was given an opportunity to ask questions.   Discharge medications sent home with patient/family: yes, in discharge summary  Discharged with nurse with all belongings. Patient medically cleared to discharge. Sister will transport home.   /58 (BP Location: Right arm)   Pulse 67   Temp 99  F (37.2  C) (Oral)   Resp 16   Wt 96.6 kg (213 lb)   SpO2 94%   BMI 35.45 kg/m    OBSERVATION patient END time: 1640

## 2020-06-22 NOTE — PLAN OF CARE
PRIMARY DIAGNOSIS: ACUTE PAIN  OUTPATIENT/OBSERVATION GOALS TO BE MET BEFORE DISCHARGE:  1. Pain Status: Improved. Pt is on scheduled chronic pain meds.     2. Return to near baseline physical activity: No     3. Cleared for discharge by consultants (if involved): No     Discharge Planner Nurse   Safe discharge environment identified: Yes  Barriers to discharge: Yes       Entered by: Joanne Dobson 06/22/2020      Please review provider order for any additional goals.   Nurse to notify provider when observation goals have been met and patient is ready for discharge.     Pt A&O, left side strength is strong, but slightly weaker. BP soft, otherwise VSS. LS clear on RA, denies SOB. Active BS. Passing gas, denies nausea. Voiding in BSC. Up SBA, GB. On mod carb diet. C/O HA 2-5/10, has chronic pain meds.   , 104.  Plan: neurology, discharge today    /47 (BP Location: Right arm)   Pulse 67   Temp 97.7  F (36.5  C) (Oral)   Resp 14   Wt 96.6 kg (213 lb)   SpO2 95%   BMI 35.45 kg/m

## 2020-06-22 NOTE — CONSULTS
"HOSPITAL NEUROLOGY      History of the Present Illness:  Ms. Juarez is a 49 year old right-handed female who has a history most significant for migraine. She is in the Hospital today, admitted 6/21/2020.    Patient presents with new abnormal movements.  This began about 2 days ago.  It was in the setting of a bad migraine.  She has a long history of migraine (she does not know the age of onset) with associated nausea and sensitivity to light and sound.  As an odd feature, she states she has long had left sided facial drooping and ptosis that a previous neurologist would treat with occipital nerve blocks.      In general her headaches have been under good control for the last year.  She estimates only one headache day per month on average.  Last week she started having a migraine with her typical left facial drooping.  But the headache continued past the typical 1 day duration.  She also felt chills and diaphoresis though \"I never had a fever.\"  3 days ago she realized she had not been taking her meds, including Buspar and gabapentin and reasoned much of her symptoms were due to medication withdrawal.      Then two days ago she started to have twitching and occasional stuttering of her speech.  Yesterday she felt ok when she woke up but as the day went on these symptoms worsened so she presented to the ER for evaluation.      Her current headache severity is rated at 6/10.      Review of systems:  Constitutional: As mentioned above.   Eyes:  +\"things are more blurry\" No eye congestion, miosis, or diplopia  ENT: No hearing loss or tinnitus, no rhinorrhea  Cardiovascular: +occasional chest pain  No chest pain or palpitation  Respiratory: No shortness of breath  Gastrointestinal: +occasional longstanding diarrhea or constipation \"I have IBS\"  No abdominal pain  Genitourinary: No burning on micturition   Musculoskeletal: +chronic back pain    Skin: No rash  Neurological: As mentioned above.  Psychiatric: " +anxiety/depression  All other systems negative or as noted in the history of the present illness.     Past Medical History:   Diagnosis Date     Anxiety      Back pain      Depressive disorder      Diabetes (H)      Endometriosis      Fibromyalgia      Gastroesophageal reflux disease      Hypertension      Migraine      Rheumatoid arthritis with rheumatoid factor (H)         Past Surgical History:   Procedure Laterality Date     BACK SURGERY       GYN SURGERY      hysterectomy     HYSTERECTOMY       LAPAROSCOPIC APPENDECTOMY N/A 3/27/2015    Procedure: LAPAROSCOPIC APPENDECTOMY;  Surgeon: Ryan Fiore MD;  Location: [facility] OR     ORTHOPEDIC SURGERY          Social History     Tobacco Use     Smoking status: Current Every Day Smoker     Packs/day: 0.25     Smokeless tobacco: Never Used   Substance Use Topics     Alcohol use: No        Father history of stroke        Current Facility-Administered Medications:      acetaminophen (TYLENOL) Suppository 650 mg, 650 mg, Rectal, Q4H PRN, Waqar Ramirez MD     acetaminophen (TYLENOL) tablet 650 mg, 650 mg, Oral, Q4H PRN, Waqar Ramirez MD, 650 mg at 06/22/20 0209     busPIRone (BUSPAR) tablet 10 mg, 10 mg, Oral, BID, Waqar Ramirez MD, 10 mg at 06/22/20 0811     glucose gel 15-30 g, 15-30 g, Oral, Q15 Min PRN **OR** dextrose 50 % injection 25-50 mL, 25-50 mL, Intravenous, Q15 Min PRN **OR** glucagon injection 1 mg, 1 mg, Subcutaneous, Q15 Min PRN, Waqar Ramirez MD     gabapentin (NEURONTIN) capsule 800 mg, 800 mg, Oral, 4x Daily, Waqar Ramirez MD, 800 mg at 06/22/20 0811     insulin aspart (NovoLOG) injection (RAPID ACTING), 1-7 Units, Subcutaneous, TID AC, Waqar Ramirez MD     insulin aspart (NovoLOG) injection (RAPID ACTING), 1-5 Units, Subcutaneous, At Bedtime, Waqar Ramirez MD     LORazepam (ATIVAN) tablet 0.5 mg, 0.5 mg, Oral, Q6H PRN, Waqar Ramirez MD     melatonin tablet 1 mg, 1 mg, Oral, At Bedtime PRN, Waqar Ramirez MD      modafinil (PROVIGIL) tablet 200 mg, 200 mg, Oral, Daily, Waqar Ramirez MD, 200 mg at 06/22/20 0813     naloxone (NARCAN) injection 0.1-0.4 mg, 0.1-0.4 mg, Intravenous, Q2 Min PRN, Waqar Ramirez MD     ondansetron (ZOFRAN-ODT) ODT tab 4 mg, 4 mg, Oral, Q6H PRN **OR** ondansetron (ZOFRAN) injection 4 mg, 4 mg, Intravenous, Q6H PRN, Waqar Ramirez MD     oxyCODONE (oxyCONTIN) 12 hr tablet 10 mg, 10 mg, Oral, Q12H, Waqar Ramirez MD, 10 mg at 06/22/20 1255     simvastatin (ZOCOR) tablet 80 mg, 80 mg, Oral, At Bedtime, Waqar Ramirez MD, 80 mg at 06/22/20 0006     sodium chloride 0.9% infusion, , Intravenous, Continuous, Nick Negrete DO, Last Rate: 100 mL/hr at 06/22/20 0804     traZODone (DESYREL) tablet 150 mg, 150 mg, Oral, At Bedtime, Waqar Ramirez MD, 150 mg at 06/22/20 0006     venlafaxine (EFFEXOR-XR) 24 hr capsule 225 mg, 225 mg, Oral, Daily, Waqar Ramirez MD, 225 mg at 06/22/20 0811    Allergies:  Allergies   Allergen Reactions     Penicillins Anaphylaxis     Patient previously tolerated cephalosporins and Amoxicillin/Augmentin per patient report after PCN reaction     Compazine [Prochlorperazine]      Morphine Hives     Sulfa Drugs      Tramadol        Physical Examination:     /47 (BP Location: Right arm)   Pulse 67   Temp 97.7  F (36.5  C) (Oral)   Resp 14   Wt 96.6 kg (213 lb)   SpO2 95%   BMI 35.45 kg/m      Body mass index is 35.45 kg/m .    GEN: Alert. NAD though somewhat agitated and frustrated  HEENT: Normocephalic / atraumatic,   NECK/BACK: No meningismus.   CV: RRR. No carotid bruits.   EXT: No cyanosis. No edema. No erythema.   SKIN: No rashes. Noted abrasion over left knee.     NEUROLOGICAL:   MENTAL STATUS:   Alert and oriented. Thought process and content unremarkable. Follows commands appropriately. Speech fluent.     CN:   II: Visual fields intact. PERRLA.   III, IV, VI: EOMI.    V: Symmetric facial sensation to light touch.   VII: Face symmetric  except for ptosis of the left eye  VIII: Hearing symmetric. No nystagmus.   IX, X: Symmetric palatal rise.   XI: Symmetric shoulder shrug.   XII: Tongue midline with symmetric movements.     MOTOR:     There are frequent movements especially in the feet (bilaterally), curling/wiggling toes, sometimes moving the ankle up and down.  Sometimes isolated movements, sometimes shaking back and forth.  The frequency is variable.  There will sometimes be movements of the hands, though this is less frequent and in the head will sometimes turn to one side or the other.             RIGHT UE: LEFT UE  - significant give-way  Deltoid              5/5             4+/5   Biceps              5/5              4+/5   Triceps             5/5              4+/5   Wrist Ext           5/5             4+/5  Finger abd        5/5             4+/5             RIGHT LE: LEFT LE:   HF     5/5            4/5   KF                     5/5           4/5   KE     5/5            4/5   PF                      5/5            4/5   DF                     5/5             4/5     Pederson's positive without hip extension on the right when asked to lift left leg    REFLEXES:       RIGHT:                LEFT:   Biceps        2/4                      2/4   Brachioradialis      2/4                      2/4   Triceps                  2/4                      2/4   Patellar                 2/4                      2/4   Achilles                 2/4                      2/4    Plantar response flexor b/l. No clonus.     SENSATION:   Light touch intact and symmetric.     CEREBELLAR:   Normal F-N-F.  No dysmetria. No nystagmus.     GAIT:   Deferred        Review of Diagnostics:  I personally reviewed and interpreted the following:    Results for SHIRA SKYLAR AIME (MRN [id number]) as of 6/22/2020 15:27   6/22/2020 06:16   Sodium 138   Potassium 4.0   Chloride 107   Carbon Dioxide 27   Urea Nitrogen 30   Creatinine 0.80   GFR Estimate 85   GFR Estimate If Black >90    Calcium 7.8 (L)       Complete Blood Count:    Recent Labs   Lab Test 06/22/20  0616 06/21/20  1455 04/21/20  2046   WBC 8.9 10.1 11.6*   RBC 3.94 4.56 4.49   HGB 12.6 14.5 14.4   HCT 38.3 43.5 43.7    271 315   LYMPH 37.0 25.3 22.1          Sed rate - 11    MRI Brain without and with Contrast  IMPRESSION:  1.  No evidence of acute infarction or other acute intracranial abnormality.  2.  Findings compatible with mild chronic small vessel ischemic change.  Reviewed images personally - non-specific white matter lesions, most notable in frontal white matter    Impression:  Ms. Juarez is a 49 year old female admitted with abnormal movements.    The movements have an odd appearance, non-stereotyped, often shaking but sometimes more spontaneous, involving all limbs and sometimes the head.  When shaking, the frequency is inconsistent.  I cannot entirely rule out chorea or dyskinesia (she does have a history of being on Abilify - though I would doubt tardive dyskinesia would have such a sudden and abrupt onset).  This is most likely a functional presentation, she has many other functional components to her exam (such as give-way weakness on the left and positive Pederson's sign).  Her MRI was unrevealing for an explanatory process such as a new stroke.  I told her that we are not going to be able to have a definitive answer for this presentation.  But she can be reassured that we do not see evidence for a dangerous process, she will have to follow up with a movement disorders specialist as an outpatient if these symptoms do not carmen.      The left eye ptosis is notable on exam as well.  Though she states that this has long been a feature of migraine.  This is a possibility, migraine can lead to excessive parasympathetic outflow which can occasionally result in ptosis.  It is a little unusual for it to be so 'locked' on one side.  She was seeing a neurologist in the past who reportedly specifically treated this with  occipital nerve block.  This probably is not reason for the injections (I would need records to review this, which would be done as an outpatient) - though conceivably a trigeminal autonomic cephalagia could be treated this way (but otherwise the timecourse of her headaches would not seem to fit this).      A sed rate was normal, I have extremely low concern for temporal arteritis.    The only further diagnostics to be considered would be a carotid doppler or CTA/MRA of the neck given the ptosis.  But given that this issue is so long-standing (and likely had been worked up by her neurologist previously) this could be done as an outpatient.      Will defer other complaints like chest pain to primary, calcium a little low but doubtful relation to her presenting complaint.    Recommendations:  - No further urgent testing from my standpoint    - If movements continue, she should see a movement disorders specialist - at the [facility] or [facility] - this is probably functional but I cannot entirely rule out dyskinesia or chorea    - Can consider a carotid ultrasound given her left sided ptosis, though as this is longstanding this can be done as an outpatient (where there could also be review of records from the previous neurologist that she mentions, perhaps this had already been worked up in the past)    - Some issues, such as the occasional chest pain she notes or the mildly reduced calcium level will defer to primary though are unlikely to be contributing to her presenting complaint        Barrington Pope MD   [facility] [phone]    1. Other migraine without status migrainosus, not intractable    2. Nonintractable headache, unspecified chronicity pattern, unspecified headache type

## 2020-06-24 ENCOUNTER — DOCUMENTATION ONLY (OUTPATIENT)
Dept: CARE COORDINATION | Facility: CLINIC | Age: 50
End: 2020-06-24

## 2020-06-24 NOTE — TELEPHONE ENCOUNTER
"  RECORDS RECEIVED FROM: Internal   Date of Appt: 6/26/20   NOTES (FOR ALL VISITS) STATUS DETAILS   OFFICE NOTE from referring provider Internal SEE INPATIENT NOTES   OFFICE NOTE from other specialist N/A    DISCHARGE SUMMARY from hospital Internal [facility]:  6/21/20-6/22/20   DISCHARGE REPORT from the ER N/A      OPERATIVE REPORT N/A    MEDICATION LIST Internal    IMAGING  (FOR ALL VISITS)     EMG N/A    EEG N/A    MRI (HEAD, NECK, SPINE) Internal [facility]:  MRI Brain 6/21/20   LUMBAR PUNCTURE N/A    ASHLEY Scan N/A    CT (HEAD, NECK, SPINE) Internal [facility]:  CT Head 6/21/20      Action 6/29/20 MV 7.37am   Action Taken Per message from Constance:  \"Dr. Morales said she also went to [facility]. Could you request all records they have as well?\"    Records request faxed faxed to [facility]         "

## 2020-06-26 ENCOUNTER — VIRTUAL VISIT (OUTPATIENT)
Dept: NEUROLOGY | Facility: CLINIC | Age: 50
End: 2020-06-26
Payer: MEDICARE

## 2020-06-26 ENCOUNTER — PRE VISIT (OUTPATIENT)
Dept: NEUROLOGY | Facility: CLINIC | Age: 50
End: 2020-06-26

## 2020-06-26 DIAGNOSIS — H02.402 PTOSIS OF EYELID, LEFT: ICD-10-CM

## 2020-06-26 DIAGNOSIS — G43.109 MIGRAINE WITH AURA AND WITHOUT STATUS MIGRAINOSUS, NOT INTRACTABLE: Primary | ICD-10-CM

## 2020-06-26 DIAGNOSIS — R25.9 ABNORMAL INVOLUNTARY MOVEMENT: ICD-10-CM
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
## 2020-06-26 NOTE — PROGRESS NOTES
"LVM x2 No Response (ALAN Bran), Responded to physician call later.    Camila Juarez is a 49 year old female who is being evaluated via a billable video visit.      The patient has been notified of following:     \"This video visit will be conducted via a call between you and your physician/provider. We have found that certain health care needs can be provided without the need for an in-person physical exam.  This service lets us provide the care you need with a video conversation.  If a prescription is necessary we can send it directly to your pharmacy.  If lab work is needed we can place an order for that and you can then stop by our lab to have the test done at a later time.    Video visits are billed at different rates depending on your insurance coverage.  Please reach out to your insurance provider with any questions.    If during the course of the call the physician/provider feels a video visit is not appropriate, you will not be charged for this service.\"    Patient has given verbal consent for Video visit? Yes    Will anyone else be joining your video visit? No        Video-Visit Details    Type of service:  Video Visit    Video Start Time: 10:06am  Video End Time: 10:38am    Originating Location (pt. Location): Home    Distant Location (provider location):  [facility] NEUROLOGY     Platform used for Video Visit: Francheska Chairez, ALAN      Physician Attestation   I, Brooke Morales MD, saw this patient and agree with the findings and plan of care as documented in the note.      I spent 32 minutes with the patient, over half of which was counseling. She has had an increase in headaches along with new associated symptoms with abnormal movements and left sided ptosis, different from her previous migraine attacks. Ptosis remains on video exam. MRI brain reviewed without new infarction to explain symptoms.     Previous headaches are consistent with episodic migraine with visual aura. Unclear " if recent episode with left sided symptoms represents hemiglegic migraine with motor aura, prominent autonomic features with migraine, or other etiology such as arterial dissection. Recommended in person exam next week and outside records to help determine migraine treatment. Counseled about theoretical risk of ergotamines and possible hemiplegic migraine.    As for movements, do not sound like migraine phenomena or seizure, possibly response to severe pain. If continue, will referral to movement disorders for further evaluation.     Brooke Morales MD

## 2020-06-27 LAB
BACTERIA SPEC CULT: NO GROWTH
SPECIMEN SOURCE: NORMAL

## 2020-06-28 LAB
BACTERIA SPEC CULT: NO GROWTH
Lab: NORMAL
SPECIMEN SOURCE: NORMAL

## 2020-06-29 ENCOUNTER — CARE COORDINATION (OUTPATIENT)
Dept: NEUROLOGY | Facility: CLINIC | Age: 50
End: 2020-06-29

## 2020-06-29 NOTE — PROGRESS NOTES
Records received from [facility] and scanned into media.  I also gave the records to Dr Morales for her review.

## 2020-06-30 ENCOUNTER — CARE COORDINATION (OUTPATIENT)
Dept: NEUROLOGY | Facility: CLINIC | Age: 50
End: 2020-06-30

## 2020-06-30 NOTE — PROGRESS NOTES
After reviewing the pt's records from [facility], Dr Morales is requesting that the pt come in for a face to face visit.  I spoke to the patient and have her scheduled for a face to face with DR Morales on 7/6 at 11:00.

## 2020-07-06 ENCOUNTER — OFFICE VISIT (OUTPATIENT)
Dept: NEUROLOGY | Facility: CLINIC | Age: 50
End: 2020-07-06
Payer: MEDICARE

## 2020-07-06 VITALS — OXYGEN SATURATION: 96 % | HEART RATE: 89 BPM | SYSTOLIC BLOOD PRESSURE: 105 MMHG | DIASTOLIC BLOOD PRESSURE: 75 MMHG

## 2020-07-06 DIAGNOSIS — I67.1 CEREBRAL ANEURYSM, NONRUPTURED: ICD-10-CM

## 2020-07-06 DIAGNOSIS — G43.109 MIGRAINE WITH AURA AND WITHOUT STATUS MIGRAINOSUS, NOT INTRACTABLE: ICD-10-CM

## 2020-07-06 DIAGNOSIS — H02.402 PTOSIS OF EYELID, LEFT: ICD-10-CM

## 2020-07-06 DIAGNOSIS — H21.562 AFFERENT PUPILLARY DEFECT OF LEFT EYE: Primary | ICD-10-CM

## 2020-07-06 RX ORDER — LORAZEPAM 1 MG/1
1 TABLET ORAL
Qty: 2 TABLET | Refills: 0 | Status: SHIPPED | OUTPATIENT
Start: 2020-07-06

## 2020-07-06 ASSESSMENT — PAIN SCALES - GENERAL: PAINLEVEL: SEVERE PAIN (7)
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
## 2020-07-06 NOTE — NURSING NOTE
Chief Complaint   Patient presents with     Consult     [facility] NEW - LEFT VENTRICULAR HYPERTROPHY      Lamar Lomax, EMT

## 2020-07-14 ENCOUNTER — ANCILLARY PROCEDURE (OUTPATIENT)
Dept: MRI IMAGING | Facility: CLINIC | Age: 50
End: 2020-07-14
Attending: PSYCHIATRY & NEUROLOGY
Payer: MEDICARE

## 2020-07-14 DIAGNOSIS — H21.562 AFFERENT PUPILLARY DEFECT OF LEFT EYE: ICD-10-CM

## 2020-07-14 DIAGNOSIS — G43.109 MIGRAINE WITH AURA AND WITHOUT STATUS MIGRAINOSUS, NOT INTRACTABLE: ICD-10-CM

## 2020-07-14 DIAGNOSIS — H02.402 PTOSIS OF EYELID, LEFT: ICD-10-CM

## 2020-07-14 DIAGNOSIS — I67.1 CEREBRAL ANEURYSM, NONRUPTURED: ICD-10-CM

## 2020-07-16 ENCOUNTER — OFFICE VISIT (OUTPATIENT)
Dept: OPHTHALMOLOGY | Facility: CLINIC | Age: 50
End: 2020-07-16
Attending: PSYCHIATRY & NEUROLOGY
Payer: MEDICARE

## 2020-07-16 DIAGNOSIS — H02.402 PTOSIS OF EYELID, LEFT: ICD-10-CM

## 2020-07-16 DIAGNOSIS — H53.40 VISUAL FIELD DEFECT: ICD-10-CM

## 2020-07-16 DIAGNOSIS — H53.40 VISUAL FIELD DEFECT: Primary | ICD-10-CM

## 2020-07-16 DIAGNOSIS — H21.562 AFFERENT PUPILLARY DEFECT OF LEFT EYE: ICD-10-CM

## 2020-07-16 DIAGNOSIS — G43.109 MIGRAINE WITH AURA AND WITHOUT STATUS MIGRAINOSUS, NOT INTRACTABLE: ICD-10-CM

## 2020-07-16 PROCEDURE — 92133 CPTRZD OPH DX IMG PST SGM ON: CPT | Mod: ZF | Performed by: OPHTHALMOLOGY

## 2020-07-16 PROCEDURE — G0463 HOSPITAL OUTPT CLINIC VISIT: HCPCS | Mod: ZF | Performed by: TECHNICIAN/TECHNOLOGIST

## 2020-07-16 PROCEDURE — 92083 EXTENDED VISUAL FIELD XM: CPT | Mod: ZF | Performed by: OPHTHALMOLOGY

## 2020-07-16 RX ORDER — PYRIDOSTIGMINE BROMIDE 60 MG/1
60 TABLET ORAL 3 TIMES DAILY
Qty: 9 TABLET | Refills: 0 | Status: SHIPPED | OUTPATIENT
Start: 2020-07-16 | End: 2020-08-18

## 2020-07-16 ASSESSMENT — REFRACTION_WEARINGRX
OD_CYLINDER: +0.50
SPECS_TYPE: SVL
OS_AXIS: 164
OS_CYLINDER: +0.50
OS_SPHERE: -2.00
OD_AXIS: 171
OD_SPHERE: -2.25

## 2020-07-16 ASSESSMENT — CUP TO DISC RATIO
OS_RATIO: 0.15
OD_RATIO: 0.15

## 2020-07-16 ASSESSMENT — VISUAL ACUITY
METHOD: SNELLEN - LINEAR
OS_CC: 20/20
OD_CC: 20/20
OD_CC+: -2
CORRECTION_TYPE: GLASSES
OS_CC+: -2

## 2020-07-16 ASSESSMENT — SLIT LAMP EXAM - LIDS
COMMENTS: NORMAL
COMMENTS: NORMAL

## 2020-07-16 ASSESSMENT — TONOMETRY
OS_IOP_MMHG: 16
OD_IOP_MMHG: 16
IOP_METHOD: ICARE

## 2020-07-16 ASSESSMENT — CONF VISUAL FIELD
METHOD: COUNTING FINGERS
OD_NORMAL: 1
OS_NORMAL: 1

## 2020-07-16 ASSESSMENT — LEVATOR FUNCTION
OS_LEVATOR: 15
OD_LEVATOR: 15

## 2020-07-16 ASSESSMENT — EXTERNAL EXAM - LEFT EYE: OS_EXAM: PTOSIS

## 2020-07-16 ASSESSMENT — MARGIN REFLEX DISTANCE
OD_MRD1: 0
OS_MRD1: 0

## 2020-07-16 ASSESSMENT — EXTERNAL EXAM - RIGHT EYE: OD_EXAM: PTOSIS

## 2020-07-16 NOTE — Clinical Note
"7/16/2020       RE: Camila Juarez  [address]  [place] [address]     Dear Colleague,    Thank you for referring your patient, Camila Juarez, to the EYE CLINIC at [facility]. Please see a copy of my visit note below.         Assessment & Plan     Camila Juarez is a 49 year old female with the following diagnoses:   1. Afferent pupillary defect of left eye    2. Migraine with aura and without status migrainosus, not intractable    3. Ptosis of eyelid, left    4. Visual field defect         Patient was sent for consultation by Dr. Brooke Morales for deficits in eye movement      49 year old woman with a long history of headaches consistent with migraine but had an unusual attack of symptoms in 12 June, with headache associated with left facial droop, slurred speech, and uncontrolled jerking of the left side of her body.  Head imaging in the emergency room did not reveal any intracranial pathology.  She was found to have low blood pressure, and was advised to stop her blood pressure medications  . She has had intermittent jerking movements, which she describes as \"tics\" that occur randomly on the left side.  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 Her symptoms were alleviated with migranal    She reports intermittent ptosis that alternates through out the day that is not associated with being tired and intermittent horizontal diplopia that resolves when she closes her left eyes.  She reports blurry vision in left intermittently that last a couple of hours not associated with pain. She also mentions intermittent weakness in left leg and arm    Meds: migranal was started on 25/june/2020     Labs   Vit b12 normal  TSH NL    Last MRI brain with and without contrast (6/21/2020)  1.  No evidence of acute infarction or other acute intracranial abnormality.  2.  Findings compatible with mild chronic small vessel ischemic " change.    MRA (7/14/2020)  No definite aneurysm or stenosis of the major  intracranial arteries    Visual acuity is 20/20 in both eyes, pupils are reactive with no APD  She tires with supraduction.  Single deangelo coles shows small exotropia. Decreased sensation left V1 and V2, facial nerve weakness of the left side (mainly frontalis).  Anterior segment normal both eyes.  Fundus exam normal both eyes.  Negative ice test.      VF: generalized depression with tunnel vision in both eyes (might be due to ptosis)  OCT RNFL normal each eye     My impression is that patient has ptosis.  She has a negative ice test.  I will obtain acetylcholine receptor binding antibody.  I will also start her on mestinon 60 mg three times a day for a 3 day trial. Side effects of mestinon discussed.  It is possible her ptosis is due to previous contact lens use.      Patient's also has monocular diplopia. This is most likely due to dry eyes.  I asked the patient to use artificial tears as well as warm compresses to the eyelid margin  on a regular basis.  I asked the patient to take fish oil capsules 2x/day for a month and then 1x/d thereafter.   If that is not beneficial we could consider punctal plugs, corticosteroid eye drops and Restasis.                  Attending Physician Attestation:  Complete documentation of historical and exam elements from today's encounter can be found in the full encounter summary report (not reduplicated in this progress note).  I personally obtained the chief complaint(s) and history of present illness.  I confirmed and edited as necessary the review of systems, past medical/surgical history, family history, social history, and examination findings as documented by others; and I examined the patient myself.  I personally reviewed the relevant tests, images, and reports as documented above.  I formulated and edited as necessary the assessment and plan and discussed the findings and management plan with the  "patient and family. I personally reviewed the ophthalmic test(s) associated with this encounter, agree with the interpretation(s) as documented by the resident/fellow, and have edited the corresponding report(s) as necessary.  - Crow Hernandez MD  Neuro-ophthalmology fellow  [facility]           Assessment & Plan     Camila Juarez is a 49 year old female with the following diagnoses:   1. Afferent pupillary defect of left eye    2. Migraine with aura and without status migrainosus, not intractable    3. Ptosis of eyelid, left    4. Visual field defect         Patient was sent for consultation by Dr. Brooke Morales for deficits in eye movement      49 year old woman with a long history of headaches consistent with migraine but had an unusual attack of symptoms in 12 June, with headache associated with left facial droop, slurred speech, and uncontrolled jerking of the left side of her body.  Head imaging in the emergency room did not reveal any intracranial pathology.  She was found to have low blood pressure, and was advised to stop her blood pressure medications  . She has had intermittent jerking movements, which she describes as \"tics\" that occur randomly on the left side.  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 Her symptoms were alleviated with migranal    She reports intermittent ptosis that alternates through out the day that is not associated with being tired and intermittent horizontal diplopia that resolves when she closes her left eyes.  She reports blurry vision in left intermittently that last a couple of hours not associated with pain. She also mentions intermittent weakness in left leg and arm    Meds: migranal was started on 25/june/2020     Labs   Vit b12 normal  TSH NL    Last MRI brain with and without contrast (6/21/2020)  1.  No evidence of acute infarction or other acute intracranial abnormality.  2.  Findings compatible with mild " chronic small vessel ischemic change.    MRA (7/14/2020)  No definite aneurysm or stenosis of the major  intracranial arteries    Visual acuity is 20/20 in both eyes, pupils are reactive with no APD  She tires with supraduction.  Single deangelo coles shows small exotropia. Decreased sensation left V1 and V2, facial nerve weakness of the left side (mainly frontalis).  Anterior segment normal both eyes.  Fundus exam normal both eyes.  Negative ice test.      VF: generalized depression with tunnel vision in both eyes (might be due to ptosis)  OCT RNFL normal each eye     My impression is that patient has ptosis.  She has a negative ice test.  I will obtain acetylcholine receptor binding antibody.  I will also start her on mestinon 60 mg three times a day for a 3 day trial. Side effects of mestinon discussed.  It is possible her ptosis is due to previous contact lens use.  She also has some funny twitching of her eyelid on the left and tends to depress her eyebrow suggesting that she has functional ptosis.     Patient's also has monocular diplopia. This is most likely due to dry eyes.  I asked the patient to use artificial tears as well as warm compresses to the eyelid margin  on a regular basis.  I asked the patient to take fish oil capsules 2x/day for a month and then 1x/d thereafter.   If that is not beneficial we could consider punctal plugs, corticosteroid eye drops and Restasis.                  Attending Physician Attestation:  Complete documentation of historical and exam elements from today's encounter can be found in the full encounter summary report (not reduplicated in this progress note).  I personally obtained the chief complaint(s) and history of present illness.  I confirmed and edited as necessary the review of systems, past medical/surgical history, family history, social history, and examination findings as documented by others; and I examined the patient myself.  I personally reviewed the relevant  tests, images, and reports as documented above.  I formulated and edited as necessary the assessment and plan and discussed the findings and management plan with the patient and family. I personally reviewed the ophthalmic test(s) associated with this encounter, agree with the interpretation(s) as documented by the resident/fellow, and have edited the corresponding report(s) as necessary.  - Crow Hernandez MD  Neuro-ophthalmology fellow  [facility]      Again, thank you for allowing me to participate in the care of your patient.      Sincerely,    Crow Sanchez MD

## 2020-07-16 NOTE — PATIENT INSTRUCTIONS
You have been diagnosed with Dry eye syndrome.  Symptoms of Dry eye syndrome can include double vision, eye pain, blurry vision, stinging, burning, tearing, eye redness.      Some useful instructions are listed below:     1.  Use artificial tears on a regular basis.  If you use artificial tear drops with preservative, you can use them up to 4-6 times per day. If you use artificial tear drops without preservatives, then you can use them more often.      2.  Wash your eyelashes with hot water.  Do not make the water so hot that you burn yourself, but the water should be more than just warm.  Often patients will wash their eyelashes in the shower under the hot water.  You should rub gently along the base of your eyelashes.      3.  Taking fish oil capsules twice a day for a month and then once a day after that can help improve Dry eye symptoms.      4.  Drink a lot of water.  Hydration can be helpful.      5.  Humidify your environment.      6.  If this is not beneficial, other treatments can include punctal plugs or cautery, corticosteroid eye drops, Restasis, Lipiflow, or autologous serum.  If you are still struggling with dry eye symptoms, call Dr. Sanchez's office for other therapies or a referral to a dry eye specialist.

## 2020-07-16 NOTE — LETTER
"2020         RE:  :  MRN: Camila Juarez  1970  [id number]     Dear Dr. Morales,    Thank you for asking me to see your very pleasant patient, Camila Juarez, in neuro-ophthalmic consultation.  I would like to thank you for sending your records and I have summarized them in the history of present illness.   My assessment and plan are below.  For further details, please see my attached clinic note.      Assessment & Plan     Camila Juarez is a 49 year old female with the following diagnoses:   1. Afferent pupillary defect of left eye    2. Migraine with aura and without status migrainosus, not intractable    3. Ptosis of eyelid, left    4. Visual field defect         Patient was sent for consultation by Dr. Brooke Morales for deficits in eye movement      49 year old woman with a long history of headaches consistent with migraine but had an unusual attack of symptoms in , with headache associated with left facial droop, slurred speech, and uncontrolled jerking of the left side of her body.  Head imaging in the emergency room did not reveal any intracranial pathology.  She was found to have low blood pressure, and was advised to stop her blood pressure medications  . She has had intermittent jerking movements, which she describes as \"tics\" that occur randomly on the left side.  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 Her symptoms were alleviated with migranal    She reports intermittent ptosis that alternates through out the day that is not associated with being tired and intermittent horizontal diplopia that resolves when she closes her left eyes.  She reports blurry vision in left intermittently that last a couple of hours not associated with pain. She also mentions intermittent weakness in left leg and arm    Meds: migranal was started on      Labs   Vit b12 normal  TSH NL    Last MRI brain with and without contrast (2020)  1.  No evidence " of acute infarction or other acute intracranial abnormality.  2.  Findings compatible with mild chronic small vessel ischemic change.    MRA (7/14/2020)  No definite aneurysm or stenosis of the major  intracranial arteries    Visual acuity is 20/20 in both eyes, pupils are reactive with no APD  She tires with supraduction.  Single deangelo coles shows small exotropia. Decreased sensation left V1 and V2, facial nerve weakness of the left side (mainly frontalis).  Anterior segment normal both eyes.  Fundus exam normal both eyes.  Negative ice test.      VF: generalized depression with tunnel vision in both eyes (might be due to ptosis)  OCT RNFL normal each eye     My impression is that patient has ptosis.  She has a negative ice test.  I will obtain acetylcholine receptor binding antibody.  I will also start her on mestinon 60 mg three times a day for a 3 day trial. Side effects of mestinon discussed.  It is possible her ptosis is due to previous contact lens use.  She also has some funny twitching of her eyelid on the left and tends to depress her eyebrow suggesting that she has functional ptosis.     Patient's also has monocular diplopia. This is most likely due to dry eyes.  I asked the patient to use artificial tears as well as warm compresses to the eyelid margin  on a regular basis.  I asked the patient to take fish oil capsules 2x/day for a month and then 1x/d thereafter.   If that is not beneficial we could consider punctal plugs, corticosteroid eye drops and Restasis.      will have her follow up in 2 months sooner as needed for worsening symptoms.  Will repeat retinal nerve fiber layer at that time to make sure she doesn't develop optic atrophy.  She has a subtle afferent pupillary defect in the left eye.  Normal individuals can have this but I want a make sure that this is not some type of optic neuropathy.  I reviewed her MRI images personally and there is no evidence of a lesion or inflammation of the left  optic nerve.          Again, thank you for allowing me to participate in the care of your patient.      Sincerely,    Crow Sanchez MD  Professor  Ophthalmology Residency   Director of Neuro-Ophthalmology  [place] - Scheie Endow Chair  Departments of Ophthalmology, Neurology, and Neurosurgery  [facility] [address] [place]  [address]  T - [phone]  F - [phone]  [email or web address]      CC: Brooke Morales MD  [address] [address]  VIA In Basket     Mor Lopez MD  [facility]  [address] [facility] [address]  VIA Facsimile: [phone]

## 2020-07-16 NOTE — NURSING NOTE
"Chief Complaint(s) and History of Present Illness(es)     New Patient     In both eyes (Neuro-oph evaluation for APD left eye and migraines).  Charactertized as  blurred vision.  Associated symptoms include headache and eye pain.              Comments     Per referral notes: Afferent pupillary defect of left eye, migraine with aura and without status migrainosus, not intractable and ptosis of left eyelid    Patient states migraine for over 10 years - injection in occipital area but would \"mimic a stroke\" and patient states would lose the function of her whole left side of the body.   +droopy HAILY, the past month becoming more permanent, usually would only appear when she gets a migraine  +blurry left eye    Patient states she was told she had an APD in the left eye and during that time she was having a migraine. No headaches or migraine today.    MAURO Hess 7/16/2020 9:15 AM                "

## 2020-07-16 NOTE — PROGRESS NOTES
"     Assessment & Plan     aCmila Juarez is a 49 year old female with the following diagnoses:   1. Afferent pupillary defect of left eye    2. Migraine with aura and without status migrainosus, not intractable    3. Ptosis of eyelid, left    4. Visual field defect         Patient was sent for consultation by Dr. Brooke Morales for deficits in eye movement      49 year old woman with a long history of headaches consistent with migraine but had an unusual attack of symptoms in 12 June, with headache associated with left facial droop, slurred speech, and uncontrolled jerking of the left side of her body.  Head imaging in the emergency room did not reveal any intracranial pathology.  She was found to have low blood pressure, and was advised to stop her blood pressure medications  . She has had intermittent jerking movements, which she describes as \"tics\" that occur randomly on the left side.  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 Her symptoms were alleviated with migranal    She reports intermittent ptosis that alternates through out the day that is not associated with being tired and intermittent horizontal diplopia that resolves when she closes her left eyes.  She reports blurry vision in left intermittently that last a couple of hours not associated with pain. She also mentions intermittent weakness in left leg and arm    Meds: migranal was started on 25/june/2020     Labs   Vit b12 normal  TSH NL    Last MRI brain with and without contrast (6/21/2020)  1.  No evidence of acute infarction or other acute intracranial abnormality.  2.  Findings compatible with mild chronic small vessel ischemic change.    MRA (7/14/2020)  No definite aneurysm or stenosis of the major  intracranial arteries    Visual acuity is 20/20 in both eyes, pupils are reactive with no APD  She tires with supraduction.  Single deangelo coles shows small exotropia. Decreased sensation left V1 and V2, facial nerve " weakness of the left side (mainly frontalis).  Anterior segment normal both eyes.  Fundus exam normal both eyes.  Negative ice test.      VF: generalized depression with tunnel vision in both eyes (might be due to ptosis)  OCT RNFL normal each eye     My impression is that patient has ptosis.  She has a negative ice test.  I will obtain acetylcholine receptor binding antibody.  I will also start her on mestinon 60 mg three times a day for a 3 day trial. Side effects of mestinon discussed.  It is possible her ptosis is due to previous contact lens use.  She also has some funny twitching of her eyelid on the left and tends to depress her eyebrow suggesting that she has functional ptosis.     Patient's also has monocular diplopia. This is most likely due to dry eyes.  I asked the patient to use artificial tears as well as warm compresses to the eyelid margin  on a regular basis.  I asked the patient to take fish oil capsules 2x/day for a month and then 1x/d thereafter.   If that is not beneficial we could consider punctal plugs, corticosteroid eye drops and Restasis.      will have her follow up in 2 months sooner as needed for worsening symptoms.  Will repeat retinal nerve fiber layer at that time to make sure she doesn't develop optic atrophy.  She has a subtle afferent pupillary defect in the left eye.  Normal individuals can have this but I want a make sure that this is not some type of optic neuropathy.  I reviewed her MRI images personally and there is no evidence of a lesion or inflammation of the left optic nerve.            Attending Physician Attestation:  Complete documentation of historical and exam elements from today's encounter can be found in the full encounter summary report (not reduplicated in this progress note).  I personally obtained the chief complaint(s) and history of present illness.  I confirmed and edited as necessary the review of systems, past medical/surgical history, family history,  social history, and examination findings as documented by others; and I examined the patient myself.  I personally reviewed the relevant tests, images, and reports as documented above.  I formulated and edited as necessary the assessment and plan and discussed the findings and management plan with the patient and family. I personally reviewed the ophthalmic test(s) associated with this encounter, agree with the interpretation(s) as documented by the resident/fellow, and have edited the corresponding report(s) as necessary.  - Crow Hernandez MD  Neuro-ophthalmology fellow  [facility]

## 2020-08-03 ENCOUNTER — OFFICE VISIT (OUTPATIENT)
Dept: NEUROLOGY | Facility: CLINIC | Age: 50
End: 2020-08-03
Payer: MEDICARE

## 2020-08-03 VITALS
DIASTOLIC BLOOD PRESSURE: 92 MMHG | SYSTOLIC BLOOD PRESSURE: 128 MMHG | HEART RATE: 76 BPM | RESPIRATION RATE: 16 BRPM | OXYGEN SATURATION: 96 %

## 2020-08-03 DIAGNOSIS — G43.709 CHRONIC MIGRAINE WITHOUT AURA WITHOUT STATUS MIGRAINOSUS, NOT INTRACTABLE: Primary | ICD-10-CM

## 2020-08-03 DIAGNOSIS — H02.402 PTOSIS OF EYELID, LEFT: ICD-10-CM

## 2020-08-03 DIAGNOSIS — H21.562 AFFERENT PUPILLARY DEFECT OF LEFT EYE: ICD-10-CM

## 2020-08-03 DIAGNOSIS — G43.109 MIGRAINE WITH AURA AND WITHOUT STATUS MIGRAINOSUS, NOT INTRACTABLE: ICD-10-CM

## 2020-08-03 DIAGNOSIS — H53.40 VISUAL FIELD DEFECT: ICD-10-CM

## 2020-08-03 RX ORDER — ONABOTULINUMTOXINA 200 [USP'U]/1
200 INJECTION, POWDER, LYOPHILIZED, FOR SOLUTION INTRADERMAL; INTRAMUSCULAR
Qty: 200 UNITS | Refills: 11
Start: 2020-08-03

## 2020-08-03 RX ORDER — MIRTAZAPINE 15 MG/1
TABLET, FILM COATED ORAL
COMMUNITY
Start: 2020-07-27

## 2020-08-03 NOTE — LETTER
8/3/2020       RE: Camila Juarez  [id number]  [facility] [address]     Dear Colleague,    Thank you for referring your patient, Camila Juarez, to the [facility] NEUROLOGY at [facility]. Please see a copy of my visit note below.    [facility]    Clinics and Surgery Center  Neurology Progress Note    Subjective:    Ms Juarez returns for follow-up after MRA imaging.  We reviewed the images together today, which did not show any aneurysm, stenosis, or other vascular malformation.    She was also seen by Dr. Sanchez of neuro-ophthalmology since I last saw her.  There is a plan in place for her to be tested for myasthenia gravis, and she was provided with a trial of Mestinon.  She was able to take this, and reports that her eyes were not as droopy and she could see better during those three days.  She was happy with this improvement.    She has not yet completed the blood work to look for myasthenia gravis.  We will help her get this appointment set up today.    Otherwise, her left side of the body symptoms have improved, and remained stable.  She has not had any further episodes of left-sided deficits.    Headaches continue but are not as severe as her initial presentation. She has been laying down when a headache is present, but does not yet have a good preventative treatment strategy.     We previously discussed a trial of botulinum toxin injections using a chronic migraine protocol to attempt to prevent possible hemiplegic migraine attacks.  She reports that she discussed this with her family, and found out that her dad and nephew tried Botox for chronic migraine as well, without success.  Due to this, she is somewhat hesitant to pursue this.    Objective:    Vitals: BP (!) 128/92   Pulse 76   Resp 16   SpO2 96%   General: Cooperative, NAD  Head: Atraumatic  Neurologic:  Mental Status: Fully alert, attentive and oriented. Speech  clear and fluent.   Cranial Nerves: Facial movements symmetric.  Slight left-sided ptosis.  Motor: No abnormal movements.  Moving all extremities.    Station/Gait: Normal casual gait.     Pertinent Investigations:    MRA head (July 14, 2020): No evidence for aneurysm or stenosis    Assessment/Plan:   Camila Juarez is a 50-year-old woman who returns for follow-up after MRA of the head and consultation with neuro-ophthalmology.  I am glad to hear that her symptoms have improved and that she has not experienced similar episodes of symptoms.  Her MRA of the head was reassuring that there is unlikely to be a vascular malformation as the cause of her symptoms.  Her left-sided ptosis and vision have also improved with a trial of Mestinon.    I encouraged her to complete the work-up for myasthenia gravis, as was recommended and ordered by Dr. Sanchez.  We will help her get a lab appointment so that she can get the labs drawn today on her way out.    Otherwise, for treatment of chronic migraine and possible hemiplegic migraine, I do think that the severe presentation of her attacks warrant prevention, and we discussed botulinum toxin injections again.  She would like to pursue this, and we will attempt to get preauthorization.    I spent 15 minutes with the patient, over half of which was counseling.  I will plan to see her back for the first set of injections.    Brooke Morales MD  Neurology   Pager [phone]

## 2020-08-03 NOTE — PROGRESS NOTES
[facility] and [facility] Center  Neurology Progress Note    Subjective:    Ms Juarez returns for follow-up after MRA imaging.  We reviewed the images together today, which did not show any aneurysm, stenosis, or other vascular malformation.    She was also seen by Dr. Sanchez of neuro-ophthalmology since I last saw her.  There is a plan in place for her to be tested for myasthenia gravis, and she was provided with a trial of Mestinon.  She was able to take this, and reports that her eyes were not as droopy and she could see better during those three days.  She was happy with this improvement.    She has not yet completed the blood work to look for myasthenia gravis.  We will help her get this appointment set up today.    Otherwise, her left side of the body symptoms have improved, and remained stable.  She has not had any further episodes of left-sided deficits.    Headaches continue but are not as severe as her initial presentation. She has been laying down when a headache is present, but does not yet have a good preventative treatment strategy.     We previously discussed a trial of botulinum toxin injections using a chronic migraine protocol to attempt to prevent possible hemiplegic migraine attacks.  She reports that she discussed this with her family, and found out that her dad and nephew tried Botox for chronic migraine as well, without success.  Due to this, she is somewhat hesitant to pursue this.    Objective:    Vitals: BP (!) 128/92   Pulse 76   Resp 16   SpO2 96%   General: Cooperative, NAD  Head: Atraumatic  Neurologic:  Mental Status: Fully alert, attentive and oriented. Speech clear and fluent.   Cranial Nerves: Facial movements symmetric.  Slight left-sided ptosis.  Motor: No abnormal movements.  Moving all extremities.    Station/Gait: Normal casual gait.     Pertinent Investigations:    MRA head (July 14, 2020): No evidence for aneurysm or  stenosis    Assessment/Plan:   Camila Juarez is a 50-year-old woman who returns for follow-up after MRA of the head and consultation with neuro-ophthalmology.  I am glad to hear that her symptoms have improved and that she has not experienced similar episodes of symptoms.  Her MRA of the head was reassuring that there is unlikely to be a vascular malformation as the cause of her symptoms.  Her left-sided ptosis and vision have also improved with a trial of Mestinon.    I encouraged her to complete the work-up for myasthenia gravis, as was recommended and ordered by Dr. Sanchez.  We will help her get a lab appointment so that she can get the labs drawn today on her way out.    Otherwise, for treatment of chronic migraine and possible hemiplegic migraine, I do think that the severe presentation of her attacks warrant prevention, and we discussed botulinum toxin injections again.  She would like to pursue this, and we will attempt to get preauthorization.    I spent 15 minutes with the patient, over half of which was counseling.  I will plan to see her back for the first set of injections.    Brooke Morales MD  Neurology   Pager [phone]

## 2020-08-03 NOTE — NURSING NOTE
Chief Complaint   Patient presents with     Headache     [facility] RETURN HEADACHE F/U AFTER TEST       Jeronimo Chairez, EMT

## 2020-08-05 LAB — ACHR BIND AB SER-SCNC: 0 NMOL/L (ref 0–0.4)

## 2020-08-17 ENCOUNTER — MYC MEDICAL ADVICE (OUTPATIENT)
Dept: OPHTHALMOLOGY | Facility: CLINIC | Age: 50
End: 2020-08-17

## 2020-08-17 DIAGNOSIS — H21.562 AFFERENT PUPILLARY DEFECT OF LEFT EYE: ICD-10-CM

## 2020-08-17 DIAGNOSIS — G70.00 MYASTHENIA GRAVIS (H): Primary | ICD-10-CM

## 2020-08-17 DIAGNOSIS — H53.40 VISUAL FIELD DEFECT: ICD-10-CM

## 2020-08-17 DIAGNOSIS — G43.109 MIGRAINE WITH AURA AND WITHOUT STATUS MIGRAINOSUS, NOT INTRACTABLE: ICD-10-CM

## 2020-08-17 DIAGNOSIS — H02.402 PTOSIS OF EYELID, LEFT: ICD-10-CM

## 2020-08-18 RX ORDER — PYRIDOSTIGMINE BROMIDE 60 MG/1
60 TABLET ORAL 3 TIMES DAILY
Qty: 90 TABLET | Refills: 11 | Status: SHIPPED | OUTPATIENT
Start: 2020-08-18 | End: 2021-10-15

## 2020-08-31 ENCOUNTER — OFFICE VISIT (OUTPATIENT)
Dept: NEUROLOGY | Facility: CLINIC | Age: 50
End: 2020-08-31
Payer: MEDICARE

## 2020-08-31 DIAGNOSIS — G43.709 CHRONIC MIGRAINE WITHOUT AURA WITHOUT STATUS MIGRAINOSUS, NOT INTRACTABLE: Primary | ICD-10-CM

## 2020-08-31 NOTE — PROGRESS NOTES
[place], [place]  Botulinum Toxin Procedure    Brooke Morales MD  Neurology    August 31, 2020    Procedure:  OnabotulinumtoxinA injections for chronic migraine  Indication:  Chronic migraine    Ms. 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  She was referred by Dr. Morales for onabotulinumtoxinA injections for headache.  Risks, benefits, and alternatives were discussed.  All questions were answered and consent given.  She decided to proceed with the injections.     Prior to initiation of botulinum toxin injections, Ms. Juarez reported 30 headache days per month, with 10 severe headache days per month. Her headaches are quite disabling and often interfere with her ability to function normally.    She has attempted other migraine prophylactic treatments in the past, which have included: TPIs.      She currently takes no other treatments for headache prevention.    Ms. Waddells pain was assessed prior to the procedure.  She rated her pain today as 4 out of 10.    Procedural Pause: Procedural pause was conducted to verify correct patient identity, procedure to be performed, correct side and site, correct patient position, and special requirements. Appropriate hand hygiene was utilized, and each injection site was prepped with alcohol wipes or Chloraprep swab.     Procedure Details: From lot number [id number], expiration date 4/2023, 200 units of onabotulinumtoxinA was diluted in 4 mL 0.9% normal saline, lot # [id number]. A total of 150 units of onabotulinumtoxinA were injected using 30 gauge 0.5 in needles into the muscles listed below. 50 units of onabotulinumtoxinA were wasted.     Injection Sites: Total = 150 units onabotulinumtoxinA      and Procerus muscles - 5 units into the left and right corrugators and 5 units into the procerus (15 units total)    Frontalis muscles - 5 units into the left superior frontalis and 5 units into the  right superior frontalis (2 injection sites per muscle) (10 units total)    Temporalis muscles - 12.5 units into the left temporalis muscle and 12.5 units into the right temporalis muscle (2 injection sites per muscle) (25 units total)    Occipitalis muscles - 12.5 units into the left occipitalis muscle and 12.5 units into the right occipitalis muscle (2 injection sites per muscle) (25 units total)    Splenius Capitis muscles - 12.5 units into the left splenius capitis muscle and 12.5 units into the right splenius capitis muscle (2 injection sites per muscle, divided into 2/3 anteriorly and 1/3 posteriorly) (25 units total)      Trapezius muscles - 25 units into the left trapezius muscle and 25 units into the right trapezius muscle (3 injection sites per muscle, divided 5 units, 10 units, 10 units, medial to lateral) (50 units total)    Ms. Juarez tolerated the procedure well without immediate complications.  She will follow up in clinic for assessment of the effectiveness of treatment.  She did not report any change in her pain level after the botulinumtoxinA injection procedure.    Brooke Morales MD  Pager: [phone]    Neurology Department  [facility] [address] [address]

## 2020-08-31 NOTE — LETTER
8/31/2020     RE: Camila Juarez  [address]  [facility] [address]     Dear Colleague,    Thank you for referring your patient, Camila Juarez, to the [facility] NEUROLOGY at [facility]. Please see a copy of my visit note below.    [place], [place]  Botulinum Toxin Procedure    Brooke Morales MD  Neurology    August 31, 2020    Procedure:  OnabotulinumtoxinA injections for chronic migraine  Indication:  Chronic migraine    Ms. 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  She was referred by Dr. Morales for onabotulinumtoxinA injections for headache.  Risks, benefits, and alternatives were discussed.  All questions were answered and consent given.  She decided to proceed with the injections.     Prior to initiation of botulinum toxin injections, Ms. Juarez reported 30 headache days per month, with 10 severe headache days per month. Her headaches are quite disabling and often interfere with her ability to function normally.    She has attempted other migraine prophylactic treatments in the past, which have included: TPIs.      She currently takes no other treatments for headache prevention.    Ms. Waddells pain was assessed prior to the procedure.  She rated her pain today as 4 out of 10.    Procedural Pause: Procedural pause was conducted to verify correct patient identity, procedure to be performed, correct side and site, correct patient position, and special requirements. Appropriate hand hygiene was utilized, and each injection site was prepped with alcohol wipes or Chloraprep swab.     Procedure Details: From lot number [id number], expiration date 4/2023, 200 units of onabotulinumtoxinA was diluted in 4 mL 0.9% normal saline, lot # [id number]. A total of 150 units of onabotulinumtoxinA were injected using 30 gauge 0.5 in needles into the muscles listed below. 50 units of  onabotulinumtoxinA were wasted.     Injection Sites: Total = 150 units onabotulinumtoxinA      and Procerus muscles - 5 units into the left and right corrugators and 5 units into the procerus (15 units total)    Frontalis muscles - 5 units into the left superior frontalis and 5 units into the right superior frontalis (2 injection sites per muscle) (10 units total)    Temporalis muscles - 12.5 units into the left temporalis muscle and 12.5 units into the right temporalis muscle (2 injection sites per muscle) (25 units total)    Occipitalis muscles - 12.5 units into the left occipitalis muscle and 12.5 units into the right occipitalis muscle (2 injection sites per muscle) (25 units total)    Splenius Capitis muscles - 12.5 units into the left splenius capitis muscle and 12.5 units into the right splenius capitis muscle (2 injection sites per muscle, divided into 2/3 anteriorly and 1/3 posteriorly) (25 units total)      Trapezius muscles - 25 units into the left trapezius muscle and 25 units into the right trapezius muscle (3 injection sites per muscle, divided 5 units, 10 units, 10 units, medial to lateral) (50 units total)    Ms. Juarez tolerated the procedure well without immediate complications.  She will follow up in clinic for assessment of the effectiveness of treatment.  She did not report any change in her pain level after the botulinumtoxinA injection procedure.    Brooke Morales MD  Pager: [phone]    Neurology Department  [facility] Surgery [address] [address]

## 2020-12-11 ENCOUNTER — OFFICE VISIT (OUTPATIENT)
Dept: NEUROLOGY | Facility: CLINIC | Age: 50
End: 2020-12-11
Payer: MEDICARE

## 2020-12-11 DIAGNOSIS — G43.709 CHRONIC MIGRAINE WITHOUT AURA WITHOUT STATUS MIGRAINOSUS, NOT INTRACTABLE: Primary | ICD-10-CM

## 2020-12-11 PROCEDURE — 64615 CHEMODENERV MUSC MIGRAINE: CPT | Performed by: PSYCHIATRY & NEUROLOGY

## 2020-12-11 NOTE — PROGRESS NOTES
[place], [place]  Botulinum Toxin Procedure    Brooke Morales MD  Neurology    December 11, 2020    Procedure:  OnabotulinumtoxinA injections for chronic migraine  Indication:  Chronic migraine    Ms. 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  She was referred by Dr. Morales for onabotulinumtoxinA injections for headache.  Risks, benefits, and alternatives were discussed.  All questions were answered and consent given.  She decided to proceed with the injections.     Prior to initiation of botulinum toxin injections, Ms. Juarez reported 30 headache days per month, with 10 severe headache days per month. Her headaches are quite disabling and often interfere with her ability to function normally.    Ms. Juarez reports 8 headache days per month currently, with 0 severe headache days per month.  She has noticed a wearing off phenomenon prior to this round of botulinum toxin injections, lasting 1 week.    She has attempted other migraine prophylactic treatments in the past, which have included: TPIs.       She currently takes no other treatments for headache prevention.    Ms. Juarez's pain was assessed prior to the procedure.  She rated her pain today as 8 out of 10.    Procedural Pause: Procedural pause was conducted to verify correct patient identity, procedure to be performed, correct side and site, correct patient position, and special requirements. Appropriate hand hygiene was utilized, and each injection site was prepped with alcohol wipes or Chloraprep swab.     Procedure Details: From lot number [id number], expiration date 8/2023, 200 units of onabotulinumtoxinA was diluted in 4 mL 0.9% normal saline, lot # -[id number]. A total of 150 units of onabotulinumtoxinA were injected using 30 gauge 0.5 in needles into the muscles listed below. 50 units of onabotulinumtoxinA were wasted.     Injection Sites: Total = 150 units onabotulinumtoxinA       and Procerus muscles - 5 units into the left and right corrugators and 5 units into the procerus (15 units total)    Frontalis muscles - 5 units into the left superior frontalis and 5 units into the right superior frontalis (2 injection sites per muscle) (10 units total)    Temporalis muscles - 12.5 units into the left temporalis muscle and 12.5 units into the right temporalis muscle (2 injection sites per muscle) (25 units total)    Occipitalis muscles - 12.5 units into the left occipitalis muscle and 12.5 units into the right occipitalis muscle (2 injection sites per muscle) (25 units total)    Splenius Capitis muscles - 12.5 units into the left splenius capitis muscle and 12.5 units into the right splenius capitis muscle (2 injection sites per muscle, divided into 2/3 anteriorly and 1/3 posteriorly) (25 units total)      Trapezius muscles - 25 units into the left trapezius muscle and 25 units into the right trapezius muscle (3 injection sites per muscle, divided 5 units, 10 units, 10 units, medial to lateral) (50 units total)    Ms. Juarez tolerated the procedure well without immediate complications.  She will follow up in clinic for assessment of the effectiveness of treatment.  She did not report any change in her pain level after the botulinumtoxinA injection procedure.    Brooke Morales MD  Pager: [phone]    Neurology Department  [facility]  Clinics and Surgery [address] [address]

## 2020-12-11 NOTE — LETTER
12/11/2020       RE: Camila Juarez  [address]  [facility] [address]     Dear Colleague,    Thank you for referring your patient, Camila Juarez, to the [facility] NEUROLOGY CLINIC [place] [facility]. Please see a copy of my visit note below.    [place], [place]  Botulinum Toxin Procedure    Brooke Morales MD  Neurology    December 11, 2020    Procedure:  OnabotulinumtoxinA injections for chronic migraine  Indication:  Chronic migraine    Ms. 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  She was referred by Dr. Morales for onabotulinumtoxinA injections for headache.  Risks, benefits, and alternatives were discussed.  All questions were answered and consent given.  She decided to proceed with the injections.     Prior to initiation of botulinum toxin injections, Ms. Juarez reported 30 headache days per month, with 10 severe headache days per month. Her headaches are quite disabling and often interfere with her ability to function normally.    Ms. Juarez reports 8 headache days per month currently, with 0 severe headache days per month.  She has noticed a wearing off phenomenon prior to this round of botulinum toxin injections, lasting 1 week.    She has attempted other migraine prophylactic treatments in the past, which have included: TPIs.       She currently takes no other treatments for headache prevention.    Ms. Juarez's pain was assessed prior to the procedure.  She rated her pain today as 8 out of 10.    Procedural Pause: Procedural pause was conducted to verify correct patient identity, procedure to be performed, correct side and site, correct patient position, and special requirements. Appropriate hand hygiene was utilized, and each injection site was prepped with alcohol wipes or Chloraprep swab.     Procedure Details: From lot number [id number], expiration  date 8/2023, 200 units of onabotulinumtoxinA was diluted in 4 mL 0.9% normal saline, lot # -[id number]. A total of 150 units of onabotulinumtoxinA were injected using 30 gauge 0.5 in needles into the muscles listed below. 50 units of onabotulinumtoxinA were wasted.     Injection Sites: Total = 150 units onabotulinumtoxinA      and Procerus muscles - 5 units into the left and right corrugators and 5 units into the procerus (15 units total)    Frontalis muscles - 5 units into the left superior frontalis and 5 units into the right superior frontalis (2 injection sites per muscle) (10 units total)    Temporalis muscles - 12.5 units into the left temporalis muscle and 12.5 units into the right temporalis muscle (2 injection sites per muscle) (25 units total)    Occipitalis muscles - 12.5 units into the left occipitalis muscle and 12.5 units into the right occipitalis muscle (2 injection sites per muscle) (25 units total)    Splenius Capitis muscles - 12.5 units into the left splenius capitis muscle and 12.5 units into the right splenius capitis muscle (2 injection sites per muscle, divided into 2/3 anteriorly and 1/3 posteriorly) (25 units total)      Trapezius muscles - 25 units into the left trapezius muscle and 25 units into the right trapezius muscle (3 injection sites per muscle, divided 5 units, 10 units, 10 units, medial to lateral) (50 units total)    Ms. Juarez tolerated the procedure well without immediate complications.  She will follow up in clinic for assessment of the effectiveness of treatment.  She did not report any change in her pain level after the botulinumtoxinA injection procedure.    Brooke Morales MD  Pager: [phone]    Neurology Department  [facility] Surgery [place] [phone]

## 2020-12-11 NOTE — NURSING NOTE
Chief Complaint   Patient presents with     Botox     [facility] Botox - Complex Migraine     [facility] Edwards

## 2021-01-15 ENCOUNTER — HEALTH MAINTENANCE LETTER (OUTPATIENT)
Age: 51
End: 2021-01-15

## 2021-01-23 ENCOUNTER — HEALTH MAINTENANCE LETTER (OUTPATIENT)
Age: 51
End: 2021-01-23

## 2021-02-03 NOTE — DISCHARGE INSTRUCTIONS
After completion of combination drops, use QD for 4-5 weeks. If you were tested, we will call you with your COVID-19 result. You don't need to call us to check on your result. You can also use the information at the end of this document to sign up for [facility] [employer] where you can get your results and a message about those results sent to you through the [employer] application.    Regardless of if you have been tested or not:  Patient who have symptoms (cough, fever, or shortness of breath), need to isolate for 7 days from when symptoms started AND 72 hours after fever resolves (without fever reducing medications) AND improvement of respiratory symptoms (whichever is longer).    Isolate yourself at home (in own room/own bathroom if possible)  Do Not allow any visitors  Do Not go to work or school  Do Not go to [employer],  centers, shopping, or other public places.  Do Not shake hands.  Avoid close and intimate contact with others (hugging, kissing).  Follow CDC recommendations for household cleaning of frequently touched services.     After the initial 7 days, continue to isolate yourself from household members as much as possible. To continue decrease the risk of community spread and exposure, you and any members of your household should limit activities in public for 14 days after starting home isolation.     You can reference the following CDC link for helpful home isolation/care tips:  https://www.cdc.gov/coronavirus/2019-ncov/downloads/10Things.pdf    Protect Others:  Cover Your Mouth and Nose with a mask, disposable tissue or wash cloth to avoid spreading germs to others.  Wash your hands and face frequently with soap and water    Call Back If: Breathing difficulty develops or you become worse.    For more information about COVID19 and options for caring for yourself at home, please visit the CDC website at https://www.cdc.gov/coronavirus/2019-ncov/about/steps-when-sick.html  For more options for care at [facility], please visit our website at  [email or web address]    For more information, please use the [place] Department of Health COVID-19 Website: https://www.[email or web address]/diseases/coronavirus/index.html  [place] Department of Health ([facility]) COVID-19 Hotlines (Interpreters available):    Health questions: Phone Number: [phone] or [phone] and Hours: 7 a.m. to 7 p.m.  Schools and  questions: Phone Number: [phone] or [phone] and Hours 7 a.m. to 7 p.m.

## 2021-02-19 ENCOUNTER — DOCUMENTATION ONLY (OUTPATIENT)
Dept: CARE COORDINATION | Facility: CLINIC | Age: 51
End: 2021-02-19

## 2021-03-15 ENCOUNTER — OFFICE VISIT (OUTPATIENT)
Dept: NEUROLOGY | Facility: CLINIC | Age: 51
End: 2021-03-15
Payer: MEDICARE

## 2021-03-15 DIAGNOSIS — G43.709 CHRONIC MIGRAINE WITHOUT AURA WITHOUT STATUS MIGRAINOSUS, NOT INTRACTABLE: Primary | ICD-10-CM

## 2021-03-15 PROCEDURE — 64615 CHEMODENERV MUSC MIGRAINE: CPT | Performed by: PSYCHIATRY & NEUROLOGY

## 2021-03-15 NOTE — LETTER
3/15/2021       RE: Camila Juarez  [id number]  [facility] [address]     Dear Colleague,    Thank you for referring your patient, Camila Juarez, to the [facility] NEUROLOGY CLINIC [facility]. Please see a copy of my visit note below.    [place], [place]  Botulinum Toxin Procedure    Brooke Morales MD  Neurology    March 15, 2021    Procedure:  OnabotulinumtoxinA injections for chronic migraine  Indication:  Chronic migraine    Ms. Juarez suffers from severe intractable headaches.  She was referred by Dr. Morales for onabotulinumtoxinA injections for headache.  Risks, benefits, and alternatives were discussed.  All questions were answered and consent given.  She decided to proceed with the injections.     Prior to initiation of botulinum toxin injections, Ms. Juarez reported 30 headache days per month, with 10 severe headache days per month. Her headaches are quite disabling and often interfere with her ability to function normally.    Ms. Juarez reports 4 headache days per month currently, with 0 severe headache days per month.  She has noticed a wearing off phenomenon prior to this round of botulinum toxin injections, lasting 2 weeks.    She has attempted other migraine prophylactic treatments in the past, which have included: TPIs.       She currently takes no other treatments for headache prevention.    Ms. Juarez's pain was assessed prior to the procedure.  She rated her pain today as 1 out of 10.    Procedural Pause: Procedural pause was conducted to verify correct patient identity, procedure to be performed, correct side and site, correct patient position, and special requirements. Appropriate hand hygiene was utilized, and each injection site was prepped with alcohol wipes or Chloraprep swab.     Procedure Details: From lot number [id number], expiration date 10/2023, 200 units of  onabotulinumtoxinA was diluted in 4 mL 0.9% normal saline, lot # [id number]. A total of 150 units of onabotulinumtoxinA were injected using 30 gauge 0.5 in needles into the muscles listed below. 50 units of onabotulinumtoxinA were wasted.     Injection Sites: Total = 150 units onabotulinumtoxinA      and Procerus muscles - 5 units into the left and right corrugators and 5 units into the procerus (15 units total)    Frontalis muscles - 5 units into the left superior frontalis and 5 units into the right superior frontalis (2 injection sites per muscle) (10 units total)    Temporalis muscles - 12.5 units into the left temporalis muscle and 12.5 units into the right temporalis muscle (2 injection sites per muscle) (25 units total)    Occipitalis muscles - 12.5 units into the left occipitalis muscle and 12.5 units into the right occipitalis muscle (2 injection sites per muscle) (25 units total)    Splenius Capitis muscles - 12.5 units into the left splenius capitis muscle and 12.5 units into the right splenius capitis muscle (2 injection sites per muscle, divided into 2/3 anteriorly and 1/3 posteriorly) (25 units total)      Trapezius muscles - 25 units into the left trapezius muscle and 25 units into the right trapezius muscle (3 injection sites per muscle, divided 5 units, 10 units, 10 units, medial to lateral) (50 units total)    Ms. Juarez tolerated the procedure well without immediate complications.  She will follow up in clinic for assessment of the effectiveness of treatment.  She did not report any change in her pain level after the botulinumtoxinA injection procedure.    Brooke Morales MD  Pager: [phone]    Neurology Department  [facility] and Surgery [address] [address]

## 2021-03-15 NOTE — PROGRESS NOTES
[place], [place]  Botulinum Toxin Procedure    Brooke Morales MD  Neurology    March 15, 2021    Procedure:  OnabotulinumtoxinA injections for chronic migraine  Indication:  Chronic migraine    Ms. Juarez suffers from severe intractable headaches.  She was referred by Dr. Morales for onabotulinumtoxinA injections for headache.  Risks, benefits, and alternatives were discussed.  All questions were answered and consent given.  She decided to proceed with the injections.     Prior to initiation of botulinum toxin injections, Ms. Juarez reported 30 headache days per month, with 10 severe headache days per month. Her headaches are quite disabling and often interfere with her ability to function normally.    Ms. Juarez reports 4 headache days per month currently, with 0 severe headache days per month.  She has noticed a wearing off phenomenon prior to this round of botulinum toxin injections, lasting 2 weeks.    She has attempted other migraine prophylactic treatments in the past, which have included: TPIs.       She currently takes no other treatments for headache prevention.    Ms. Waddells pain was assessed prior to the procedure.  She rated her pain today as 1 out of 10.    Procedural Pause: Procedural pause was conducted to verify correct patient identity, procedure to be performed, correct side and site, correct patient position, and special requirements. Appropriate hand hygiene was utilized, and each injection site was prepped with alcohol wipes or Chloraprep swab.     Procedure Details: From lot number [id number], expiration date 10/2023, 200 units of onabotulinumtoxinA was diluted in 4 mL 0.9% normal saline, lot # [id number]. A total of 150 units of onabotulinumtoxinA were injected using 30 gauge 0.5 in needles into the muscles listed below. 50 units of onabotulinumtoxinA were wasted.     Injection Sites: Total = 150 units onabotulinumtoxinA      and Procerus muscles - 5 units  into the left and right corrugators and 5 units into the procerus (15 units total)    Frontalis muscles - 5 units into the left superior frontalis and 5 units into the right superior frontalis (2 injection sites per muscle) (10 units total)    Temporalis muscles - 12.5 units into the left temporalis muscle and 12.5 units into the right temporalis muscle (2 injection sites per muscle) (25 units total)    Occipitalis muscles - 12.5 units into the left occipitalis muscle and 12.5 units into the right occipitalis muscle (2 injection sites per muscle) (25 units total)    Splenius Capitis muscles - 12.5 units into the left splenius capitis muscle and 12.5 units into the right splenius capitis muscle (2 injection sites per muscle, divided into 2/3 anteriorly and 1/3 posteriorly) (25 units total)      Trapezius muscles - 25 units into the left trapezius muscle and 25 units into the right trapezius muscle (3 injection sites per muscle, divided 5 units, 10 units, 10 units, medial to lateral) (50 units total)    Ms. Juarez tolerated the procedure well without immediate complications.  She will follow up in clinic for assessment of the effectiveness of treatment.  She did not report any change in her pain level after the botulinumtoxinA injection procedure.    Brooke Morales MD  Pager: [phone]    Neurology Department  [facility] and Surgery [address] [address]

## 2021-05-09 ENCOUNTER — HEALTH MAINTENANCE LETTER (OUTPATIENT)
Age: 51
End: 2021-05-09

## 2021-05-25 ENCOUNTER — RECORDS - HEALTHEAST (OUTPATIENT)
Dept: ADMINISTRATIVE | Facility: CLINIC | Age: 51
End: 2021-05-25

## 2021-05-27 ENCOUNTER — RECORDS - HEALTHEAST (OUTPATIENT)
Dept: ADMINISTRATIVE | Facility: CLINIC | Age: 51
End: 2021-05-27

## 2021-06-07 ENCOUNTER — OFFICE VISIT (OUTPATIENT)
Dept: NEUROLOGY | Facility: CLINIC | Age: 51
End: 2021-06-07
Payer: MEDICARE

## 2021-06-07 DIAGNOSIS — G43.709 CHRONIC MIGRAINE WITHOUT AURA WITHOUT STATUS MIGRAINOSUS, NOT INTRACTABLE: Primary | ICD-10-CM

## 2021-06-07 PROCEDURE — 64615 CHEMODENERV MUSC MIGRAINE: CPT | Performed by: PSYCHIATRY & NEUROLOGY

## 2021-06-07 NOTE — LETTER
6/7/2021       RE: Camila Juarez  [address]  [facility] [address]     Dear Colleague,    Thank you for referring your patient, Camila Juarez, to the [facility] NEUROLOGY CLINIC [facility]. Please see a copy of my visit note below.    [place], [place]  Botulinum Toxin Procedure    Brooke Morales MD  Neurology    June 7, 2021    Procedure:  OnabotulinumtoxinA injections for chronic migraine  Indication:  Chronic migraine    Ms. Juarez suffers from severe intractable headaches.  She was referred by Dr. Morales for onabotulinumtoxinA injections for headache.  Risks, benefits, and alternatives were discussed.  All questions were answered and consent given.  She decided to proceed with the injections.     Prior to initiation of botulinum toxin injections, Ms. Juarez reported 30 headache days per month, with 10 severe headache days per month. Her headaches are quite disabling and often interfere with her ability to function normally.    Ms. Juarez reports 4 headache days per month currently, with 0 severe headache days per month.  She has noticed a wearing off phenomenon prior to this round of botulinum toxin injections, lasting 2 weeks.    She has attempted other migraine prophylactic treatments in the past, which have included: TPIs.       She currently takes no other treatments for headache prevention.    Ms. Juarez's pain was assessed prior to the procedure.  She rated her pain today as 0 out of 10.    Procedural Pause: Procedural pause was conducted to verify correct patient identity, procedure to be performed, correct side and site, correct patient position, and special requirements. Appropriate hand hygiene was utilized, and each injection site was prepped with alcohol wipes or Chloraprep swab.     Procedure Details: From lot number [id number], expiration date 2/2024, 200 units of  onabotulinumtoxinA was diluted in 4 mL 0.9% normal saline, lot # -[id number]. A total of 150 units of onabotulinumtoxinA were injected using 30 gauge 0.5 in needles into the muscles listed below. 50 units of onabotulinumtoxinA were wasted.     Injection Sites: Total = 150 units onabotulinumtoxinA      and Procerus muscles - 5 units into the left and right corrugators and 5 units into the procerus (15 units total)    Frontalis muscles - 5 units into the left superior frontalis and 5 units into the right superior frontalis (2 injection sites per muscle) (10 units total)    Temporalis muscles - 12.5 units into the left temporalis muscle and 12.5 units into the right temporalis muscle (2 injection sites per muscle) (25 units total)    Occipitalis muscles - 12.5 units into the left occipitalis muscle and 12.5 units into the right occipitalis muscle (2 injection sites per muscle) (25 units total)    Splenius Capitis muscles - 12.5 units into the left splenius capitis muscle and 12.5 units into the right splenius capitis muscle (2 injection sites per muscle, divided into 2/3 anteriorly and 1/3 posteriorly) (25 units total)      Trapezius muscles - 25 units into the left trapezius muscle and 25 units into the right trapezius muscle (3 injection sites per muscle, divided 5 units, 10 units, 10 units, medial to lateral) (50 units total)    Ms. Juarez tolerated the procedure well without immediate complications.  She will follow up in clinic for assessment of the effectiveness of treatment.  She did not report any change in her pain level after the botulinumtoxinA injection procedure.    Brooke Morales MD  Pager: [phone]    Neurology Department  [facility] Surgery [place] [id number]

## 2021-06-07 NOTE — PROGRESS NOTES
[place], [place]  Botulinum Toxin Procedure    Brooke Morales MD  Neurology    June 7, 2021    Procedure:  OnabotulinumtoxinA injections for chronic migraine  Indication:  Chronic migraine    Ms. Juarez suffers from severe intractable headaches.  She was referred by Dr. Morales for onabotulinumtoxinA injections for headache.  Risks, benefits, and alternatives were discussed.  All questions were answered and consent given.  She decided to proceed with the injections.     Prior to initiation of botulinum toxin injections, Ms. Juarez reported 30 headache days per month, with 10 severe headache days per month. Her headaches are quite disabling and often interfere with her ability to function normally.    Ms. Juarez reports 4 headache days per month currently, with 0 severe headache days per month.  She has noticed a wearing off phenomenon prior to this round of botulinum toxin injections, lasting 2 weeks.    She has attempted other migraine prophylactic treatments in the past, which have included: TPIs.       She currently takes no other treatments for headache prevention.    Ms. Waddells pain was assessed prior to the procedure.  She rated her pain today as 0 out of 10.    Procedural Pause: Procedural pause was conducted to verify correct patient identity, procedure to be performed, correct side and site, correct patient position, and special requirements. Appropriate hand hygiene was utilized, and each injection site was prepped with alcohol wipes or Chloraprep swab.     Procedure Details: From lot number [id number], expiration date 2/2024, 200 units of onabotulinumtoxinA was diluted in 4 mL 0.9% normal saline, lot # [id number]. A total of 150 units of onabotulinumtoxinA were injected using 30 gauge 0.5 in needles into the muscles listed below. 50 units of onabotulinumtoxinA were wasted.     Injection Sites: Total = 150 units onabotulinumtoxinA      and Procerus muscles - 5 units  into the left and right corrugators and 5 units into the procerus (15 units total)    Frontalis muscles - 5 units into the left superior frontalis and 5 units into the right superior frontalis (2 injection sites per muscle) (10 units total)    Temporalis muscles - 12.5 units into the left temporalis muscle and 12.5 units into the right temporalis muscle (2 injection sites per muscle) (25 units total)    Occipitalis muscles - 12.5 units into the left occipitalis muscle and 12.5 units into the right occipitalis muscle (2 injection sites per muscle) (25 units total)    Splenius Capitis muscles - 12.5 units into the left splenius capitis muscle and 12.5 units into the right splenius capitis muscle (2 injection sites per muscle, divided into 2/3 anteriorly and 1/3 posteriorly) (25 units total)      Trapezius muscles - 25 units into the left trapezius muscle and 25 units into the right trapezius muscle (3 injection sites per muscle, divided 5 units, 10 units, 10 units, medial to lateral) (50 units total)    Ms. Juarez tolerated the procedure well without immediate complications.  She will follow up in clinic for assessment of the effectiveness of treatment.  She did not report any change in her pain level after the botulinumtoxinA injection procedure.    Brooke Morales MD  Pager: [phone]    Neurology Department  [facility] and Surgery [address] [address]

## 2021-08-30 ENCOUNTER — OFFICE VISIT (OUTPATIENT)
Dept: NEUROLOGY | Facility: CLINIC | Age: 51
End: 2021-08-30
Payer: MEDICARE

## 2021-08-30 DIAGNOSIS — G43.709 CHRONIC MIGRAINE WITHOUT AURA WITHOUT STATUS MIGRAINOSUS, NOT INTRACTABLE: Primary | ICD-10-CM

## 2021-08-30 PROCEDURE — 64615 CHEMODENERV MUSC MIGRAINE: CPT | Performed by: PSYCHIATRY & NEUROLOGY

## 2021-08-30 NOTE — PROGRESS NOTES
[place], [place]  Botulinum Toxin Procedure    Brooke Morales MD  Neurology    August 30, 2021    Procedure:  OnabotulinumtoxinA injections for chronic migraine  Indication:  Chronic migraine    Ms. Juarez suffers from severe intractable headaches.  She was referred by Dr. Morales for onabotulinumtoxinA injections for headache.  Risks, benefits, and alternatives were discussed.  All questions were answered and consent given.  She decided to proceed with the injections.     Prior to initiation of botulinum toxin injections, Ms. Juarez reported 30 headache days per month, with 10 severe headache days per month. Her headaches are quite disabling and often interfere with her ability to function normally.    Ms. Juarez reports 4 headache days per month currently, with 0 severe headache days per month.  She has noticed a wearing off phenomenon prior to this round of botulinum toxin injections, lasting 2 weeks.    She has attempted other migraine prophylactic treatments in the past, which have included: TPIs.       She currently takes no other treatments for headache prevention.    Procedural Pause: Procedural pause was conducted to verify correct patient identity, procedure to be performed, correct side and site, correct patient position, and special requirements. Appropriate hand hygiene was utilized, and each injection site was prepped with alcohol wipes or Chloraprep swab.     Procedure Details: 200 units of onabotulinumtoxinA was diluted in 4 mL 0.9% normal saline. A total of 150 units of onabotulinumtoxinA were injected using 30 gauge 0.5 in needles into the muscles listed below. 50 units of onabotulinumtoxinA were wasted.     Injection Sites: Total = 150 units onabotulinumtoxinA      and Procerus muscles - 5 units into the left and right corrugators and 5 units into the procerus (15 units total)    Frontalis muscles - 5 units into the left superior frontalis and 5 units into  the right superior frontalis (2 injection sites per muscle) (10 units total)    Temporalis muscles - 12.5 units into the left temporalis muscle and 12.5 units into the right temporalis muscle (2 injection sites per muscle) (25 units total)    Occipitalis muscles - 12.5 units into the left occipitalis muscle and 12.5 units into the right occipitalis muscle (2 injection sites per muscle) (25 units total)    Splenius Capitis muscles - 12.5 units into the left splenius capitis muscle and 12.5 units into the right splenius capitis muscle (2 injection sites per muscle, divided into 2/3 anteriorly and 1/3 posteriorly) (25 units total)      Trapezius muscles - 25 units into the left trapezius muscle and 25 units into the right trapezius muscle (3 injection sites per muscle, divided 5 units, 10 units, 10 units, medial to lateral) (50 units total)    Ms. Juarez tolerated the procedure well without immediate complications.  She will follow up in clinic for assessment of the effectiveness of treatment.  She did not report any change in her pain level after the botulinumtoxinA injection procedure.    Brooke Morales MD  Pager: [phone]    Neurology Department  [facility] [address] [address]

## 2021-08-30 NOTE — LETTER
8/30/2021       RE: Camila Juarez  [id number]  [facility] [address]     Dear Colleague,    Thank you for referring your patient, Camila Juarez, to the [facility]. Please see a copy of my visit note below.    [place], [place]  Botulinum Toxin Procedure    Brooke Morales MD  Neurology    August 30, 2021    Procedure:  OnabotulinumtoxinA injections for chronic migraine  Indication:  Chronic migraine    Ms. Juarez suffers from severe intractable headaches.  She was referred by Dr. Morales for onabotulinumtoxinA injections for headache.  Risks, benefits, and alternatives were discussed.  All questions were answered and consent given.  She decided to proceed with the injections.     Prior to initiation of botulinum toxin injections, Ms. Juarez reported 30 headache days per month, with 10 severe headache days per month. Her headaches are quite disabling and often interfere with her ability to function normally.    Ms. Juarez reports 4 headache days per month currently, with 0 severe headache days per month.  She has noticed a wearing off phenomenon prior to this round of botulinum toxin injections, lasting 2 weeks.    She has attempted other migraine prophylactic treatments in the past, which have included: TPIs.       She currently takes no other treatments for headache prevention.    Procedural Pause: Procedural pause was conducted to verify correct patient identity, procedure to be performed, correct side and site, correct patient position, and special requirements. Appropriate hand hygiene was utilized, and each injection site was prepped with alcohol wipes or Chloraprep swab.     Procedure Details: 200 units of onabotulinumtoxinA was diluted in 4 mL 0.9% normal saline. A total of 150 units of onabotulinumtoxinA were injected using 30 gauge 0.5 in needles into the  muscles listed below. 50 units of onabotulinumtoxinA were wasted.     Injection Sites: Total = 150 units onabotulinumtoxinA      and Procerus muscles - 5 units into the left and right corrugators and 5 units into the procerus (15 units total)    Frontalis muscles - 5 units into the left superior frontalis and 5 units into the right superior frontalis (2 injection sites per muscle) (10 units total)    Temporalis muscles - 12.5 units into the left temporalis muscle and 12.5 units into the right temporalis muscle (2 injection sites per muscle) (25 units total)    Occipitalis muscles - 12.5 units into the left occipitalis muscle and 12.5 units into the right occipitalis muscle (2 injection sites per muscle) (25 units total)    Splenius Capitis muscles - 12.5 units into the left splenius capitis muscle and 12.5 units into the right splenius capitis muscle (2 injection sites per muscle, divided into 2/3 anteriorly and 1/3 posteriorly) (25 units total)      Trapezius muscles - 25 units into the left trapezius muscle and 25 units into the right trapezius muscle (3 injection sites per muscle, divided 5 units, 10 units, 10 units, medial to lateral) (50 units total)    Ms. Juarez tolerated the procedure well without immediate complications.  She will follow up in clinic for assessment of the effectiveness of treatment.  She did not report any change in her pain level after the botulinumtoxinA injection procedure.    Brooke Morales MD  Pager: [phone]    Neurology Department  [facility] [place] [address]        Again, thank you for allowing me to participate in the care of your patient.      Sincerely,    Brooke Morales MD

## 2021-10-14 DIAGNOSIS — H02.402 PTOSIS OF EYELID, LEFT: ICD-10-CM

## 2021-10-14 DIAGNOSIS — H53.40 VISUAL FIELD DEFECT: ICD-10-CM

## 2021-10-14 DIAGNOSIS — G43.109 MIGRAINE WITH AURA AND WITHOUT STATUS MIGRAINOSUS, NOT INTRACTABLE: ICD-10-CM

## 2021-10-14 DIAGNOSIS — G70.00 MYASTHENIA GRAVIS (H): ICD-10-CM

## 2021-10-14 DIAGNOSIS — H21.562 AFFERENT PUPILLARY DEFECT OF LEFT EYE: ICD-10-CM

## 2021-10-15 RX ORDER — PYRIDOSTIGMINE BROMIDE 60 MG/1
60 TABLET ORAL 3 TIMES DAILY
Qty: 90 TABLET | Refills: 0 | Status: SHIPPED | OUTPATIENT
Start: 2021-10-15 | End: 2021-12-31

## 2021-10-15 NOTE — TELEPHONE ENCOUNTER
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

## 2021-10-16 NOTE — TELEPHONE ENCOUNTER
Will refill once as patient seems to benefit from mestinon but she should see Dr Sanchez again for more refills.

## 2021-10-24 ENCOUNTER — HEALTH MAINTENANCE LETTER (OUTPATIENT)
Age: 51
End: 2021-10-24

## 2021-12-29 DIAGNOSIS — H02.402 PTOSIS OF EYELID, LEFT: ICD-10-CM

## 2021-12-29 DIAGNOSIS — G70.00 MYASTHENIA GRAVIS (H): ICD-10-CM

## 2021-12-29 DIAGNOSIS — G43.109 MIGRAINE WITH AURA AND WITHOUT STATUS MIGRAINOSUS, NOT INTRACTABLE: ICD-10-CM

## 2021-12-29 DIAGNOSIS — H21.562 AFFERENT PUPILLARY DEFECT OF LEFT EYE: ICD-10-CM

## 2021-12-29 DIAGNOSIS — H53.40 VISUAL FIELD DEFECT: ICD-10-CM

## 2021-12-31 RX ORDER — PYRIDOSTIGMINE BROMIDE 60 MG/1
60 TABLET ORAL 3 TIMES DAILY
Qty: 90 TABLET | Refills: 1 | Status: SHIPPED | OUTPATIENT
Start: 2021-12-31 | End: 2022-02-03

## 2021-12-31 NOTE — TELEPHONE ENCOUNTER
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]       Migraine with aura and without status migrainosus, not intractable [G43.109]       Ptosis of eyelid, left [H02.402]       Visual field defect [H53.40]       Myasthenia gravis (H) [G70.00]         Requested directions: same  Current directions on the medication list: Take 1 tablet (60 mg) by mouth 3 times daily     Last Written Prescription Date:  10/15/21  Last Fill Quantity: 90,   # refills: 0    Last Office Visit: 7/16/20  Future Office visit: none    Attending Provider: Crow Sanchez MD  Last Clinic Note: 7/16/20    Routing refill request to provider for review/approval because:    Not on protocol    Scheduling has been notified to contact the pt for appointment.

## 2022-01-05 ENCOUNTER — TELEPHONE (OUTPATIENT)
Dept: OPHTHALMOLOGY | Facility: CLINIC | Age: 52
End: 2022-01-05
Payer: MEDICARE

## 2022-01-05 NOTE — TELEPHONE ENCOUNTER
Left voicemail for patient that she is overdue for follow up with Dr. Sanchez and need to schedule appointment before any further refills.      aCmila Patel on 1/5/2022 at 4:02 PM

## 2022-02-02 ENCOUNTER — TELEPHONE (OUTPATIENT)
Dept: OPHTHALMOLOGY | Facility: CLINIC | Age: 52
End: 2022-02-02
Payer: MEDICARE

## 2022-02-02 ENCOUNTER — TELEPHONE (OUTPATIENT)
Dept: NEUROLOGY | Facility: CLINIC | Age: 52
End: 2022-02-02
Payer: MEDICARE

## 2022-02-02 DIAGNOSIS — G70.00 MYASTHENIA GRAVIS (H): ICD-10-CM

## 2022-02-02 DIAGNOSIS — H53.40 VISUAL FIELD DEFECT: ICD-10-CM

## 2022-02-02 DIAGNOSIS — G43.109 MIGRAINE WITH AURA AND WITHOUT STATUS MIGRAINOSUS, NOT INTRACTABLE: ICD-10-CM

## 2022-02-02 DIAGNOSIS — H21.562 AFFERENT PUPILLARY DEFECT OF LEFT EYE: ICD-10-CM

## 2022-02-02 DIAGNOSIS — H02.402 PTOSIS OF EYELID, LEFT: ICD-10-CM

## 2022-02-02 NOTE — TELEPHONE ENCOUNTER
[facility] Call Center    Phone Message    May a detailed message be left on voicemail: yes     Reason for Call: Medication Refill Request    Has the patient contacted the pharmacy for the refill? Yes   Name of medication being requested: pyridostigmine (MESTINON) 60 MG tablet  Provider who prescribed the medication: Dr Sanchez  Pharmacy: [facility] [address] [place], [place] - [address] [facility]  Date medication is needed: ASAP     Pt never picked up 12/31/21 Prescription    Action Taken: Message routed to:  [facility] eye    Travel Screening: Not Applicable

## 2022-02-03 RX ORDER — PYRIDOSTIGMINE BROMIDE 60 MG/1
60 TABLET ORAL 3 TIMES DAILY
Qty: 90 TABLET | Refills: 1 | Status: SHIPPED | OUTPATIENT
Start: 2022-02-03 | End: 2022-05-05

## 2022-02-03 NOTE — TELEPHONE ENCOUNTER
I called pt and offered botox with Dr. Morales on 2/7 at 1p. Pt accepted time.     Constance CHAMPAGNE

## 2022-02-07 ENCOUNTER — OFFICE VISIT (OUTPATIENT)
Dept: NEUROLOGY | Facility: CLINIC | Age: 52
End: 2022-02-07
Payer: MEDICARE

## 2022-02-07 DIAGNOSIS — G43.709 CHRONIC MIGRAINE WITHOUT AURA WITHOUT STATUS MIGRAINOSUS, NOT INTRACTABLE: Primary | ICD-10-CM

## 2022-02-07 PROCEDURE — 64615 CHEMODENERV MUSC MIGRAINE: CPT | Performed by: PSYCHIATRY & NEUROLOGY

## 2022-02-07 NOTE — PROGRESS NOTES
[place], [place]  Botulinum Toxin Procedure    Brooke Morales MD  Neurology    February 7, 2022    Procedure:  OnabotulinumtoxinA injections for chronic migraine  Indication:  Chronic migraine    Ms. Juarez suffers from severe intractable headaches.  She was referred by Dr. Morales for onabotulinumtoxinA injections for headache.  Risks, benefits, and alternatives were discussed.  All questions were answered and consent given.  She decided to proceed with the injections.     Prior to initiation of botulinum toxin injections, Ms. Juraez reported 30 headache days per month, with 10 severe headache days per month. Her headaches are quite disabling and often interfere with her ability to function normally.    Ms. Juarez reports 4 headache days per month currently, with 0 severe headache days per month.  She has noticed a wearing off phenomenon prior to this round of botulinum toxin injections, lasting 2 weeks. She is overdue for injections.    She has attempted other migraine prophylactic treatments in the past, which have included: TPIs.      She currently takes no other treatments for headache prevention.    Ms. Juraez's pain was assessed prior to the procedure.     Procedural Pause: Procedural pause was conducted to verify correct patient identity, procedure to be performed, correct side and site, correct patient position, and special requirements. Appropriate hand hygiene was utilized, and each injection site was prepped with alcohol wipes or Chloraprep swab.     Procedure Details: 200 units of onabotulinumtoxinA was diluted in 4 mL 0.9% normal saline. A total of 150 units of onabotulinumtoxinA were injected using 30 gauge 0.5 in needles into the muscles listed below. 50 units of onabotulinumtoxinA were wasted.     Injection Sites: Total = 150 units onabotulinumtoxinA      and Procerus muscles - 5 units into the left and right corrugators and 5 units into the procerus (15 units  total)    Frontalis muscles - 5 units into the left superior frontalis and 5 units into the right superior frontalis (2 injection sites per muscle) (10 units total)    Temporalis muscles - 12.5 units into the left temporalis muscle and 12.5 units into the right temporalis muscle (2 injection sites per muscle) (25 units total)    Occipitalis muscles - 12.5 units into the left occipitalis muscle and 12.5 units into the right occipitalis muscle (2 injection sites per muscle) (25 units total)    Splenius Capitis muscles - 12.5 units into the left splenius capitis muscle and 12.5 units into the right splenius capitis muscle (2 injection sites per muscle, divided into 2/3 anteriorly and 1/3 posteriorly) (25 units total)      Trapezius muscles - 25 units into the left trapezius muscle and 25 units into the right trapezius muscle (3 injection sites per muscle, divided 5 units, 10 units, 10 units, medial to lateral) (50 units total)    Ms. Juarez tolerated the procedure well without immediate complications.  She will follow up in clinic for assessment of the effectiveness of treatment.  She did not report any change in her pain level after the botulinumtoxinA injection procedure.    Brooke Morales MD    Neurology Department  [facility] [place] [address]

## 2022-02-07 NOTE — LETTER
2/7/2022        RE: Camila Juarez  [address]  [place] [facility] [address]     Dear Colleague,    Thank you for referring your patient, Camila Juarez, to the [facility] [place] [facility]. Please see a copy of my visit note below.    [place], [place]  Botulinum Toxin Procedure    Brooke Morales MD  Neurology    February 7, 2022    Procedure:  OnabotulinumtoxinA injections for chronic migraine  Indication:  Chronic migraine    Ms. Juarez suffers from severe intractable headaches.  She was referred by Dr. Morales for onabotulinumtoxinA injections for headache.  Risks, benefits, and alternatives were discussed.  All questions were answered and consent given.  She decided to proceed with the injections.     Prior to initiation of botulinum toxin injections, Ms. Juarez reported 30 headache days per month, with 10 severe headache days per month. Her headaches are quite disabling and often interfere with her ability to function normally.    Ms. Juarez reports 4 headache days per month currently, with 0 severe headache days per month.  She has noticed a wearing off phenomenon prior to this round of botulinum toxin injections, lasting 2 weeks. She is overdue for injections.    She has attempted other migraine prophylactic treatments in the past, which have included: TPIs.      She currently takes no other treatments for headache prevention.    Ms. Juarez's pain was assessed prior to the procedure.     Procedural Pause: Procedural pause was conducted to verify correct patient identity, procedure to be performed, correct side and site, correct patient position, and special requirements. Appropriate hand hygiene was utilized, and each injection site was prepped with alcohol wipes or Chloraprep swab.     Procedure Details: 200 units of onabotulinumtoxinA was diluted in 4 mL 0.9% normal saline. A total of  150 units of onabotulinumtoxinA were injected using 30 gauge 0.5 in needles into the muscles listed below. 50 units of onabotulinumtoxinA were wasted.     Injection Sites: Total = 150 units onabotulinumtoxinA      and Procerus muscles - 5 units into the left and right corrugators and 5 units into the procerus (15 units total)    Frontalis muscles - 5 units into the left superior frontalis and 5 units into the right superior frontalis (2 injection sites per muscle) (10 units total)    Temporalis muscles - 12.5 units into the left temporalis muscle and 12.5 units into the right temporalis muscle (2 injection sites per muscle) (25 units total)    Occipitalis muscles - 12.5 units into the left occipitalis muscle and 12.5 units into the right occipitalis muscle (2 injection sites per muscle) (25 units total)    Splenius Capitis muscles - 12.5 units into the left splenius capitis muscle and 12.5 units into the right splenius capitis muscle (2 injection sites per muscle, divided into 2/3 anteriorly and 1/3 posteriorly) (25 units total)      Trapezius muscles - 25 units into the left trapezius muscle and 25 units into the right trapezius muscle (3 injection sites per muscle, divided 5 units, 10 units, 10 units, medial to lateral) (50 units total)    Ms. Juarez tolerated the procedure well without immediate complications.  She will follow up in clinic for assessment of the effectiveness of treatment.  She did not report any change in her pain level after the botulinumtoxinA injection procedure.    Brooke Morales MD    Neurology Department  [facility] [place] [address]      Again, thank you for allowing me to participate in the care of your patient.      Sincerely,    Brooke Morales MD

## 2022-02-17 ENCOUNTER — APPOINTMENT (OUTPATIENT)
Dept: CT IMAGING | Facility: CLINIC | Age: 52
End: 2022-02-17
Attending: EMERGENCY MEDICINE
Payer: MEDICARE

## 2022-02-17 ENCOUNTER — HOSPITAL ENCOUNTER (EMERGENCY)
Facility: CLINIC | Age: 52
Discharge: HOME | End: 2022-02-17
Attending: EMERGENCY MEDICINE | Admitting: EMERGENCY MEDICINE
Payer: MEDICARE

## 2022-02-17 ENCOUNTER — APPOINTMENT (OUTPATIENT)
Dept: GENERAL RADIOLOGY | Facility: CLINIC | Age: 52
End: 2022-02-17
Attending: EMERGENCY MEDICINE
Payer: MEDICARE

## 2022-02-17 VITALS
SYSTOLIC BLOOD PRESSURE: 115 MMHG | DIASTOLIC BLOOD PRESSURE: 69 MMHG | HEART RATE: 63 BPM | RESPIRATION RATE: 18 BRPM | OXYGEN SATURATION: 98 % | TEMPERATURE: 97.2 F

## 2022-02-17 DIAGNOSIS — R51.9 NONINTRACTABLE HEADACHE, UNSPECIFIED CHRONICITY PATTERN, UNSPECIFIED HEADACHE TYPE: ICD-10-CM

## 2022-02-17 DIAGNOSIS — M62.81 GENERALIZED MUSCLE WEAKNESS: ICD-10-CM

## 2022-02-17 LAB
ALBUMIN UR-MCNC: NEGATIVE MG/DL
ANION GAP SERPL CALCULATED.3IONS-SCNC: 7 MMOL/L (ref 3–14)
APPEARANCE UR: CLEAR
BASOPHILS # BLD AUTO: 0 10E3/UL (ref 0–0.2)
BASOPHILS NFR BLD AUTO: 0 %
BILIRUB UR QL STRIP: NEGATIVE
BUN SERPL-MCNC: 25 MG/DL (ref 7–30)
CALCIUM SERPL-MCNC: 9.4 MG/DL (ref 8.5–10.1)
CHLORIDE BLD-SCNC: 108 MMOL/L (ref 94–109)
CO2 SERPL-SCNC: 27 MMOL/L (ref 20–32)
COLOR UR AUTO: ABNORMAL
CREAT SERPL-MCNC: 0.62 MG/DL (ref 0.52–1.04)
EOSINOPHIL # BLD AUTO: 0 10E3/UL (ref 0–0.7)
EOSINOPHIL NFR BLD AUTO: 0 %
ERYTHROCYTE [DISTWIDTH] IN BLOOD BY AUTOMATED COUNT: 12.6 % (ref 10–15)
FLUAV RNA SPEC QL NAA+PROBE: NEGATIVE
FLUBV RNA RESP QL NAA+PROBE: NEGATIVE
GFR SERPL CREATININE-BSD FRML MDRD: >90 ML/MIN/1.73M2
GLUCOSE BLD-MCNC: 113 MG/DL (ref 70–99)
GLUCOSE UR STRIP-MCNC: NEGATIVE MG/DL
HCT VFR BLD AUTO: 46.9 % (ref 35–47)
HGB BLD-MCNC: 15.5 G/DL (ref 11.7–15.7)
HGB UR QL STRIP: NEGATIVE
HOLD SPECIMEN: NORMAL
HOLD SPECIMEN: NORMAL
IMM GRANULOCYTES # BLD: 0 10E3/UL
IMM GRANULOCYTES NFR BLD: 0 %
KETONES UR STRIP-MCNC: NEGATIVE MG/DL
LACTATE SERPL-SCNC: 1.5 MMOL/L (ref 0.7–2)
LEUKOCYTE ESTERASE UR QL STRIP: NEGATIVE
LYMPHOCYTES # BLD AUTO: 2.6 10E3/UL (ref 0.8–5.3)
LYMPHOCYTES NFR BLD AUTO: 39 %
MCH RBC QN AUTO: 31.6 PG (ref 26.5–33)
MCHC RBC AUTO-ENTMCNC: 33 G/DL (ref 31.5–36.5)
MCV RBC AUTO: 96 FL (ref 78–100)
MONOCYTES # BLD AUTO: 0.7 10E3/UL (ref 0–1.3)
MONOCYTES NFR BLD AUTO: 10 %
MUCOUS THREADS #/AREA URNS LPF: PRESENT /LPF
NEUTROPHILS # BLD AUTO: 3.5 10E3/UL (ref 1.6–8.3)
NEUTROPHILS NFR BLD AUTO: 51 %
NITRATE UR QL: NEGATIVE
NRBC # BLD AUTO: 0 10E3/UL
NRBC BLD AUTO-RTO: 0 /100
NT-PROBNP SERPL-MCNC: 9 PG/ML (ref 0–900)
PH UR STRIP: 7 [PH] (ref 5–7)
PLATELET # BLD AUTO: 306 10E3/UL (ref 150–450)
POTASSIUM BLD-SCNC: 3.5 MMOL/L (ref 3.4–5.3)
RBC # BLD AUTO: 4.9 10E6/UL (ref 3.8–5.2)
RBC URINE: 1 /HPF
SARS-COV-2 RNA RESP QL NAA+PROBE: NEGATIVE
SODIUM SERPL-SCNC: 142 MMOL/L (ref 133–144)
SP GR UR STRIP: 1.02 (ref 1–1.03)
SQUAMOUS EPITHELIAL: 4 /HPF
TROPONIN I SERPL HS-MCNC: <3 NG/L
UROBILINOGEN UR STRIP-MCNC: NORMAL MG/DL
WBC # BLD AUTO: 6.8 10E3/UL (ref 4–11)
WBC URINE: 1 /HPF

## 2022-02-17 PROCEDURE — 96374 THER/PROPH/DIAG INJ IV PUSH: CPT

## 2022-02-17 PROCEDURE — 250N000011 HC RX IP 250 OP 636: Performed by: EMERGENCY MEDICINE

## 2022-02-17 PROCEDURE — 96361 HYDRATE IV INFUSION ADD-ON: CPT

## 2022-02-17 PROCEDURE — 93005 ELECTROCARDIOGRAM TRACING: CPT

## 2022-02-17 PROCEDURE — 258N000003 HC RX IP 258 OP 636: Performed by: EMERGENCY MEDICINE

## 2022-02-17 PROCEDURE — 36415 COLL VENOUS BLD VENIPUNCTURE: CPT | Performed by: EMERGENCY MEDICINE

## 2022-02-17 PROCEDURE — 81001 URINALYSIS AUTO W/SCOPE: CPT | Performed by: EMERGENCY MEDICINE

## 2022-02-17 PROCEDURE — 96375 TX/PRO/DX INJ NEW DRUG ADDON: CPT

## 2022-02-17 PROCEDURE — 85004 AUTOMATED DIFF WBC COUNT: CPT | Performed by: EMERGENCY MEDICINE

## 2022-02-17 PROCEDURE — 70450 CT HEAD/BRAIN W/O DYE: CPT | Mod: MC

## 2022-02-17 PROCEDURE — 83880 ASSAY OF NATRIURETIC PEPTIDE: CPT | Performed by: EMERGENCY MEDICINE

## 2022-02-17 PROCEDURE — C9803 HOPD COVID-19 SPEC COLLECT: HCPCS

## 2022-02-17 PROCEDURE — 99285 EMERGENCY DEPT VISIT HI MDM: CPT | Mod: 25

## 2022-02-17 PROCEDURE — 71046 X-RAY EXAM CHEST 2 VIEWS: CPT

## 2022-02-17 PROCEDURE — 84484 ASSAY OF TROPONIN QUANT: CPT | Performed by: EMERGENCY MEDICINE

## 2022-02-17 PROCEDURE — 83605 ASSAY OF LACTIC ACID: CPT | Performed by: EMERGENCY MEDICINE

## 2022-02-17 PROCEDURE — 82310 ASSAY OF CALCIUM: CPT | Performed by: EMERGENCY MEDICINE

## 2022-02-17 PROCEDURE — 87636 SARSCOV2 & INF A&B AMP PRB: CPT | Performed by: EMERGENCY MEDICINE

## 2022-02-17 RX ORDER — METOCLOPRAMIDE HYDROCHLORIDE 5 MG/ML
10 INJECTION INTRAMUSCULAR; INTRAVENOUS ONCE
Status: COMPLETED | OUTPATIENT
Start: 2022-02-17 | End: 2022-02-17

## 2022-02-17 RX ORDER — DIPHENHYDRAMINE HYDROCHLORIDE 50 MG/ML
12.5 INJECTION INTRAMUSCULAR; INTRAVENOUS ONCE
Status: COMPLETED | OUTPATIENT
Start: 2022-02-17 | End: 2022-02-17

## 2022-02-17 RX ORDER — DEXAMETHASONE SODIUM PHOSPHATE 10 MG/ML
10 INJECTION, SOLUTION INTRAMUSCULAR; INTRAVENOUS ONCE
Status: COMPLETED | OUTPATIENT
Start: 2022-02-17 | End: 2022-02-17

## 2022-02-17 RX ADMIN — DEXAMETHASONE SODIUM PHOSPHATE 10 MG: 10 INJECTION, SOLUTION INTRAMUSCULAR; INTRAVENOUS at 20:19

## 2022-02-17 RX ADMIN — DIPHENHYDRAMINE HYDROCHLORIDE 12.5 MG: 50 INJECTION INTRAMUSCULAR; INTRAVENOUS at 20:19

## 2022-02-17 RX ADMIN — SODIUM CHLORIDE 1000 ML: 9 INJECTION, SOLUTION INTRAVENOUS at 20:19

## 2022-02-17 RX ADMIN — METOCLOPRAMIDE HYDROCHLORIDE 10 MG: 5 INJECTION INTRAMUSCULAR; INTRAVENOUS at 20:19

## 2022-02-17 ASSESSMENT — ENCOUNTER SYMPTOMS
VOMITING: 0
TROUBLE SWALLOWING: 0
FLANK PAIN: 0
JOINT SWELLING: 0
BRUISES/BLEEDS EASILY: 0
HEADACHES: 1
SORE THROAT: 1
STRIDOR: 0
ADENOPATHY: 0
CHILLS: 0
NAUSEA: 0
BACK PAIN: 0
UNEXPECTED WEIGHT CHANGE: 0
SHORTNESS OF BREATH: 1
MYALGIAS: 1
DIAPHORESIS: 0
VOICE CHANGE: 0
DYSURIA: 0
NECK STIFFNESS: 0
CHEST TIGHTNESS: 1
NUMBNESS: 0
WHEEZING: 0
NECK PAIN: 0
DIARRHEA: 0
PALPITATIONS: 0
CHOKING: 0
FEVER: 0
COUGH: 1
ABDOMINAL PAIN: 0
WEAKNESS: 1
FATIGUE: 1

## 2022-02-17 NOTE — ED PROVIDER NOTES
History     Chief Complaint:  Fatigue       HPI   Camila Juarez is a 51 year old female who presents with feeling too weak to get out of bed today.  Has been having off and on congestion cough body aches for last month.  History of migraines.  Has had headache but karol migraine equivalent which is complex with stroke like sx.  Chronic IBS as well.  No new NVD.  Denies fever or chills but is having body aches and cramps.      ROS:  Review of Systems   Constitutional: Positive for fatigue. Negative for chills, diaphoresis, fever and unexpected weight change.   HENT: Positive for congestion, sneezing and sore throat. Negative for nosebleeds, trouble swallowing and voice change.    Respiratory: Positive for cough, chest tightness and shortness of breath. Negative for choking, wheezing and stridor.    Cardiovascular: Negative for chest pain, palpitations and leg swelling.   Gastrointestinal: Negative for abdominal pain, diarrhea, nausea and vomiting.   Genitourinary: Negative for dysuria and flank pain.   Musculoskeletal: Positive for myalgias. Negative for back pain, joint swelling, neck pain and neck stiffness.   Skin: Negative for rash.   Neurological: Positive for weakness and headaches. Negative for syncope and numbness.   Hematological: Negative for adenopathy. Does not bruise/bleed easily.   All other systems reviewed and are negative.         Allergies:  Penicillins  Compazine [Prochlorperazine]  Morphine  Tramadol  Sulfa Drugs     Medications:    Botulinum Toxin Type A (BOTOX) 200 units injection  busPIRone (BUSPAR) 10 MG tablet  dihydroergotamine (MIGRANAL) 4 MG/ML nasal spray  gabapentin (NEURONTIN) 800 MG tablet  LORazepam (ATIVAN) 0.5 MG tablet  LORazepam (ATIVAN) 1 MG tablet  meloxicam (MOBIC) 15 MG tablet  mirtazapine (REMERON) 15 MG tablet  modafinil (PROVIGIL) 200 MG tablet  oxyCODONE (XTAMPZA ER) 9 MG 12 hr tablet  pantoprazole (PROTONIX) 40 MG EC tablet  pyridostigmine (MESTINON) 60 MG  tablet  simvastatin (ZOCOR) 80 MG tablet  traZODone (DESYREL) 150 MG tablet  venlafaxine (EFFEXOR-XR) 150 MG 24 hr capsule  venlafaxine (EFFEXOR-XR) 75 MG 24 hr capsule  vitamin D2 (ERGOCALCIFEROL) 02650 units (1250 mcg) capsule        Past Medical History:    Past Medical History:   Diagnosis Date     Anxiety      Back pain      Depressive disorder      Diabetes (H)      Endometriosis      Fibromyalgia      Gastroesophageal reflux disease      Hypertension      Migraine      Rheumatoid arthritis with rheumatoid factor (H)      Patient Active Problem List   Diagnosis     Acute appendicitis     Head ache        Past Surgical History:    Past Surgical History:   Procedure Laterality Date     BACK SURGERY       GYN SURGERY      hysterectomy     HYSTERECTOMY       LAPAROSCOPIC APPENDECTOMY N/A 3/27/2015    Procedure: LAPAROSCOPIC APPENDECTOMY;  Surgeon: Ryan Fiore MD;  Location: [facility] OR     ORTHOPEDIC SURGERY          Family History:    family history is not on file.    Social History:   reports that she has been smoking. She has been smoking about 0.25 packs per day. She has never used smokeless tobacco. She reports that she does not drink alcohol and does not use drugs.  PCP: Mor Lopez     Physical Exam     Patient Vitals for the past 24 hrs:   BP Temp Temp src Pulse Resp SpO2   02/17/22 2110 115/69 -- -- 63 -- 98 %   02/17/22 2030 102/88 -- -- 94 -- --   02/17/22 1945 -- -- -- -- -- 94 %   02/17/22 1930 (!) 160/88 -- -- 80 -- 96 %   02/17/22 1915 -- -- -- -- -- 96 %   02/17/22 1900 (!) 167/107 -- -- 83 -- 94 %   02/17/22 1850 -- -- -- -- -- 96 %   02/17/22 1845 -- -- -- -- -- 94 %   02/17/22 1830 (!) 162/110 -- -- 82 -- 96 %   02/17/22 1800 (!) 149/94 -- -- 70 -- 97 %   02/17/22 1745 (!) 164/98 -- -- 69 -- --   02/17/22 1738 -- 97.2  F (36.2  C) Temporal 83 18 94 %        Physical Exam  Constitutional: Patient is well appearing. No distress.  Not toxic appearing.  Head:  Atraumatic.  Mouth/Throat: Oropharynx is clear and moist. No oropharyngeal exudate.  Eyes: Conjunctivae and EOM are normal. No scleral icterus.  Neck: Normal range of motion. Neck supple.   Cardiovascular: Normal rate, regular rhythm, normal heart sounds and intact distal pulses.   Pulmonary/Chest: Breath sounds normal. No respiratory distress.  Abdominal: Soft. Bowel sounds are normal. No distension. No tenderness. No rebound or guarding.   Musculoskeletal: Normal range of motion. No edema or tenderness.   Neurological: Alert and orientated to person, place, and time. No observable focal neuro deficit.  Very anxious  Skin: Warm and dry. No rash noted. Not diaphoretic.     Emergency Department Course   ECG:      Imaging:  Head CT w/o contrast   Final Result   IMPRESSION:   1.  Normal head CT.      XR Chest 2 Views   Final Result   IMPRESSION: Negative chest.         Report per radiology    Laboratory:  Labs Ordered and Resulted from Time of ED Arrival to Time of ED Departure   BASIC METABOLIC PANEL - Abnormal       Result Value    Sodium 142      Potassium 3.5      Chloride 108      Carbon Dioxide (CO2) 27      Anion Gap 7      Urea Nitrogen 25      Creatinine 0.62      Calcium 9.4      Glucose 113 (*)     GFR Estimate >90     ROUTINE UA WITH MICROSCOPIC REFLEX TO CULTURE - Abnormal    Color Urine Light Yellow      Appearance Urine Clear      Glucose Urine Negative      Bilirubin Urine Negative      Ketones Urine Negative      Specific Gravity Urine 1.024      Blood Urine Negative      pH Urine 7.0      Protein Albumin Urine Negative      Urobilinogen Urine Normal      Nitrite Urine Negative      Leukocyte Esterase Urine Negative      Mucus Urine Present (*)     RBC Urine 1      WBC Urine 1      Squamous Epithelials Urine 4 (*)    LACTIC ACID WHOLE BLOOD - Normal    Lactic Acid 1.5     TROPONIN I - Normal    Troponin I High Sensitivity <3     NT PROBNP INPATIENT - Normal    N terminal Pro BNP Inpatient 9      INFLUENZA A/B & SARS-COV2 PCR MULTIPLEX - Normal    Influenza A PCR Negative      Influenza B PCR Negative      SARS CoV2 PCR Negative     CBC WITH PLATELETS AND DIFFERENTIAL    WBC Count 6.8      RBC Count 4.90      Hemoglobin 15.5      Hematocrit 46.9      MCV 96      MCH 31.6      MCHC 33.0      RDW 12.6      Platelet Count 306      % Neutrophils 51      % Lymphocytes 39      % Monocytes 10      % Eosinophils 0      % Basophils 0      % Immature Granulocytes 0      NRBCs per 100 WBC 0      Absolute Neutrophils 3.5      Absolute Lymphocytes 2.6      Absolute Monocytes 0.7      Absolute Eosinophils 0.0      Absolute Basophils 0.0      Absolute Immature Granulocytes 0.0      Absolute NRBCs 0.0          Procedures       Emergency Department Course:             Reviewed:  I reviewed nursing notes, vitals and past medical history    Assessments:   I obtained history and examined the patient as noted above.    I rechecked the patient and explained findings.       Consults:       Interventions:  Medications   metoclopramide (REGLAN) injection 10 mg (10 mg Intravenous Given 2/17/22 2019)   dexamethasone PF (DECADRON) injection 10 mg (10 mg Intravenous Given 2/17/22 2019)   0.9% sodium chloride BOLUS (0 mLs Intravenous Stopped 2/17/22 2105)   diphenhydrAMINE (BENADRYL) injection 12.5 mg (12.5 mg Intravenous Given 2/17/22 2019)        Disposition:  The patient was discharged to home.     Impression & Plan        Covid-19  Camila Juarez was evaluated during a global COVID-19 pandemic, which necessitated consideration that the patient might be at risk for infection with the SARS-CoV-2 virus that causes COVID-19.   Applicable protocols for evaluation were followed during the patient's care.   COVID-19 was considered as part of the patient's evaluation. The plan for testing is:  a test was obtained during this visit.    Medical Decision Making:    Pt with generalized weakness and episodes of difficulty getting out of bed  for over a year.  No focal neuro sx.  Migraines and HA again today.  Head CT normal an complete relief after interventions here ambulatory without difficulty.  Also considered infectious hematologic, CV resp as well and all workup and exam normal at this time.  Pt feels reassured and would like to go home.  Strict return and F/u given.      Diagnosis:    ICD-10-CM    1. Generalized muscle weakness  M62.81    2. Nonintractable headache, unspecified chronicity pattern, unspecified headache type  R51.9         Discharge Medications:  Discharge Medication List as of 2/17/2022  9:07 PM           2/17/2022   Jovon Henning MD Stevens, Andrew C, MD  02/18/22 0022

## 2022-02-17 NOTE — ED TRIAGE NOTES
Pt presents with generalized weakness, fatigue, body aches, lower lip tingling, shortness of breath x1 month that has been intermittent.

## 2022-02-18 LAB
ATRIAL RATE - MUSE: 67 BPM
DIASTOLIC BLOOD PRESSURE - MUSE: NORMAL MMHG
INTERPRETATION ECG - MUSE: NORMAL
P AXIS - MUSE: 39 DEGREES
PR INTERVAL - MUSE: 152 MS
QRS DURATION - MUSE: 96 MS
QT - MUSE: 426 MS
QTC - MUSE: 450 MS
R AXIS - MUSE: -9 DEGREES
SYSTOLIC BLOOD PRESSURE - MUSE: NORMAL MMHG
T AXIS - MUSE: 55 DEGREES
VENTRICULAR RATE- MUSE: 67 BPM

## 2022-02-18 NOTE — ED NOTES
Patient ambulated with steady, yet slow gait. Pt noted her headache worsened to a 10 after ambulating. MD notified.

## 2022-03-22 DIAGNOSIS — H53.40 VISUAL FIELD DEFECT: Primary | ICD-10-CM

## 2022-05-02 ENCOUNTER — OFFICE VISIT (OUTPATIENT)
Dept: NEUROLOGY | Facility: CLINIC | Age: 52
End: 2022-05-02
Payer: MEDICARE

## 2022-05-02 DIAGNOSIS — G43.709 CHRONIC MIGRAINE WITHOUT AURA WITHOUT STATUS MIGRAINOSUS, NOT INTRACTABLE: Primary | ICD-10-CM

## 2022-05-02 PROCEDURE — 64615 CHEMODENERV MUSC MIGRAINE: CPT | Performed by: PSYCHIATRY & NEUROLOGY

## 2022-05-02 NOTE — LETTER
5/2/2022       RE: Camila Juarez  [id number]  [facility] [address]     Dear Colleague,    Thank you for referring your patient, Camila Juarez, to the [facility] [place] at [facility]. Please see a copy of my visit note below.      May 2, 2022    Procedure:  OnabotulinumtoxinA injections for chronic migraine  Indication:  Chronic migraine    Ms. Juarez suffers from severe intractable headaches.  She was referred by Dr. Morales for onabotulinumtoxinA injections for headache.  Risks, benefits, and alternatives were discussed.  All questions were answered and consent given.  She decided to proceed with the injections.     Prior to initiation of botulinum toxin injections, Ms. Juarez reported 30 headache days per month, with 10 severe headache days per month. Her headaches are quite disabling and often interfere with her ability to function normally.    Ms. Juarez reports 4 headache days per month currently, with 2 severe headache days per month.  She has noticed a wearing off phenomenon prior to this round of botulinum toxin injections, lasting 2 weeks.     She has attempted other migraine prophylactic treatments in the past, which have included: TPIs.       She currently takes no other treatments for headache prevention.    Ms. Juarez's pain was assessed prior to the procedure.  She rated her pain today as 4 out of 10.    Procedural Pause: Procedural pause was conducted to verify correct patient identity, procedure to be performed, correct side and site, correct patient position, and special requirements. Appropriate hand hygiene was utilized, and each injection site was prepped with alcohol wipes or Chloraprep swab.     Procedure Details: 200 units of onabotulinumtoxinA was diluted in 4 mL 0.9% normal saline. A total of 150 units of onabotulinumtoxinA were injected using 30 gauge 0.5 in needles into the muscles listed  below. 50 units of onabotulinumtoxinA were wasted.     Injection Sites: Total = 150 units onabotulinumtoxinA      and Procerus muscles - 5 units into the left and right corrugators and 5 units into the procerus (15 units total)    Frontalis muscles - 5 units into the left superior frontalis and 5 units into the right superior frontalis (2 injection sites per muscle) (10 units total)    Temporalis muscles - 12.5 units into the left temporalis muscle and 12.5 units into the right temporalis muscle (2 injection sites per muscle) (25 units total)    Occipitalis muscles - 12.5 units into the left occipitalis muscle and 12.5 units into the right occipitalis muscle (2 injection sites per muscle) (25 units total)    Splenius Capitis muscles - 12.5 units into the left splenius capitis muscle and 12.5 units into the right splenius capitis muscle (2 injection sites per muscle, divided into 2/3 anteriorly and 1/3 posteriorly) (25 units total)      Trapezius muscles - 25 units into the left trapezius muscle and 25 units into the right trapezius muscle (3 injection sites per muscle, divided 5 units, 10 units, 10 units, medial to lateral) (50 units total)    Ms. Juarez tolerated the procedure well without immediate complications.  She will follow up in clinic for assessment of the effectiveness of treatment.  She did not report any change in her pain level after the botulinumtoxinA injection procedure.      Brooke Morales MD    Neurology Department  [facility] [place] [phone]

## 2022-05-02 NOTE — PROGRESS NOTES
[place], [place]  Botulinum Toxin Procedure    Brooke Morales MD  Neurology    May 2, 2022    Procedure:  OnabotulinumtoxinA injections for chronic migraine  Indication:  Chronic migraine    Ms. Juarez suffers from severe intractable headaches.  She was referred by Dr. Morales for onabotulinumtoxinA injections for headache.  Risks, benefits, and alternatives were discussed.  All questions were answered and consent given.  She decided to proceed with the injections.     Prior to initiation of botulinum toxin injections, Ms. Juarez reported 30 headache days per month, with 10 severe headache days per month. Her headaches are quite disabling and often interfere with her ability to function normally.    Ms. Juarez reports 4 headache days per month currently, with 2 severe headache days per month.  She has noticed a wearing off phenomenon prior to this round of botulinum toxin injections, lasting 2 weeks.     She has attempted other migraine prophylactic treatments in the past, which have included: TPIs.       She currently takes no other treatments for headache prevention.    Ms. Waddells pain was assessed prior to the procedure.  She rated her pain today as 4 out of 10.    Procedural Pause: Procedural pause was conducted to verify correct patient identity, procedure to be performed, correct side and site, correct patient position, and special requirements. Appropriate hand hygiene was utilized, and each injection site was prepped with alcohol wipes or Chloraprep swab.     Procedure Details: 200 units of onabotulinumtoxinA was diluted in 4 mL 0.9% normal saline. A total of 150 units of onabotulinumtoxinA were injected using 30 gauge 0.5 in needles into the muscles listed below. 50 units of onabotulinumtoxinA were wasted.     Injection Sites: Total = 150 units onabotulinumtoxinA      and Procerus muscles - 5 units into the left and right corrugators and 5 units into the procerus  (15 units total)    Frontalis muscles - 5 units into the left superior frontalis and 5 units into the right superior frontalis (2 injection sites per muscle) (10 units total)    Temporalis muscles - 12.5 units into the left temporalis muscle and 12.5 units into the right temporalis muscle (2 injection sites per muscle) (25 units total)    Occipitalis muscles - 12.5 units into the left occipitalis muscle and 12.5 units into the right occipitalis muscle (2 injection sites per muscle) (25 units total)    Splenius Capitis muscles - 12.5 units into the left splenius capitis muscle and 12.5 units into the right splenius capitis muscle (2 injection sites per muscle, divided into 2/3 anteriorly and 1/3 posteriorly) (25 units total)      Trapezius muscles - 25 units into the left trapezius muscle and 25 units into the right trapezius muscle (3 injection sites per muscle, divided 5 units, 10 units, 10 units, medial to lateral) (50 units total)    Ms. Juarez tolerated the procedure well without immediate complications.  She will follow up in clinic for assessment of the effectiveness of treatment.  She did not report any change in her pain level after the botulinumtoxinA injection procedure.    Brooke Morales MD    Neurology Department  [facility] [place] [address]

## 2022-05-03 ENCOUNTER — TELEPHONE (OUTPATIENT)
Dept: NEUROLOGY | Facility: CLINIC | Age: 52
End: 2022-05-03
Payer: MEDICARE

## 2022-05-03 NOTE — TELEPHONE ENCOUNTER
----- Message from ALAN Taveras sent at 5/2/2022  2:37 PM CDT -----  Pt wants to be scheduled in [place] with Andrew. Last injection was 5/2

## 2022-05-05 ENCOUNTER — OFFICE VISIT (OUTPATIENT)
Dept: OPHTHALMOLOGY | Facility: CLINIC | Age: 52
End: 2022-05-05
Attending: OPHTHALMOLOGY
Payer: MEDICARE

## 2022-05-05 DIAGNOSIS — G43.109 MIGRAINE WITH AURA AND WITHOUT STATUS MIGRAINOSUS, NOT INTRACTABLE: ICD-10-CM

## 2022-05-05 DIAGNOSIS — G70.00 MYASTHENIA GRAVIS (H): Primary | ICD-10-CM

## 2022-05-05 DIAGNOSIS — H21.562 AFFERENT PUPILLARY DEFECT OF LEFT EYE: ICD-10-CM

## 2022-05-05 DIAGNOSIS — H53.40 VISUAL FIELD DEFECT: Primary | ICD-10-CM

## 2022-05-05 DIAGNOSIS — H02.402 PTOSIS OF EYELID, LEFT: ICD-10-CM

## 2022-05-05 DIAGNOSIS — H53.40 VISUAL FIELD DEFECT: ICD-10-CM

## 2022-05-05 PROCEDURE — 92133 CPTRZD OPH DX IMG PST SGM ON: CPT | Performed by: OPHTHALMOLOGY

## 2022-05-05 PROCEDURE — 92012 INTRM OPH EXAM EST PATIENT: CPT | Performed by: OPHTHALMOLOGY

## 2022-05-05 PROCEDURE — 92083 EXTENDED VISUAL FIELD XM: CPT | Performed by: OPHTHALMOLOGY

## 2022-05-05 PROCEDURE — G0463 HOSPITAL OUTPT CLINIC VISIT: HCPCS | Performed by: TECHNICIAN/TECHNOLOGIST

## 2022-05-05 RX ORDER — PYRIDOSTIGMINE BROMIDE 60 MG/1
60 TABLET ORAL 3 TIMES DAILY
Qty: 270 TABLET | Refills: 0 | Status: SHIPPED | OUTPATIENT
Start: 2022-05-05

## 2022-05-05 ASSESSMENT — CONF VISUAL FIELD
OS_NORMAL: 1
OD_NORMAL: 1
METHOD: COUNTING FINGERS

## 2022-05-05 ASSESSMENT — REFRACTION_WEARINGRX
SPECS_TYPE: SVL
OS_AXIS: 164
OS_CYLINDER: +0.50
OD_AXIS: 171
OD_SPHERE: -2.25
OD_CYLINDER: +0.50
OS_SPHERE: -2.00

## 2022-05-05 ASSESSMENT — TONOMETRY
OS_IOP_MMHG: 11
IOP_METHOD: ICARE
OD_IOP_MMHG: 10

## 2022-05-05 ASSESSMENT — SLIT LAMP EXAM - LIDS
COMMENTS: NORMAL
COMMENTS: NORMAL

## 2022-05-05 ASSESSMENT — LEVATOR FUNCTION
OD_LEVATOR: 20
OS_LEVATOR: 16

## 2022-05-05 ASSESSMENT — EXTERNAL EXAM - LEFT EYE: OS_EXAM: PTOSIS

## 2022-05-05 ASSESSMENT — VISUAL ACUITY
METHOD: SNELLEN - LINEAR
OS_CC: 20/20
CORRECTION_TYPE: GLASSES
OD_CC: 20/20

## 2022-05-05 ASSESSMENT — MARGIN REFLEX DISTANCE
OD_MRD1: 2
OS_MRD1: 2

## 2022-05-05 ASSESSMENT — LAGOPHTHALMOS
OD_LAGOPHTHALMOS: 0
OS_LAGOPHTHALMOS: 0

## 2022-05-05 ASSESSMENT — EXTERNAL EXAM - RIGHT EYE: OD_EXAM: PTOSIS

## 2022-05-05 ASSESSMENT — CUP TO DISC RATIO
OS_RATIO: 0.15
OD_RATIO: 0.15

## 2022-05-05 NOTE — PROGRESS NOTES
Assessment & Plan     Camila Juarez is a 51 year old female with the following diagnoses:   1. Myasthenia gravis (H)    2. Visual field defect    3. Afferent pupillary defect of left eye    4. Migraine with aura and without status migrainosus, not intractable    5. Ptosis of eyelid, left         Last visit was on 7/16/2020. At that time noted to have ptosis. AChR binding Ab ordered at that time was negative.     She reports that she continues to have ptosis of both upper lids, left greater than right. Denies diplopia, dysphagia. She is taking Mestinon 60 mg BID without significant side effects.     She was recently in the ED with headaches. Patient reports complex migrainous symptoms with left sided paralysis. Reports that her left eyelid was more droopy at that time. CT of the head at that time was unremarkable.    Visual acuity with correction was 20/20 in the right eye and 20/20 in the left eye. IOP was 10 in the right eye and 11 in the left eye. Visual fields were full in both eyes. Ocular motility was full in all gaze directions. Color plates were 11/11 in the right eye and 11/11 in the left eye.  Pupils were equal, round and reactive to light with no RAPD.     External exam: ptosis without fatigability    Strabismus exam: ortho  Anterior segment exam: normal  Dilated fundus exam: normal    Tests ordered and interpreted today:  OCT rNFL demonstrated a mean NFL thickness of 98 in the right eye and 100 in the left eye. Thickness profile demonstrated normal thickness in all quadrants in the right eye and normal thickness in all quadrants in the left eye.  VF: Glaucoma TOP visual field was performed. Test was reliable. Both eyes demonstrated non-specific deficits.    She has a history of ptosis.  Acetylcholine scepter antibodies were normal.  She believes that the Mestinon is helpful for her ptosis.  She states that if she misses a day or a couple of days of the Mestinon the ptosis returns but when she  takes the medication the ptosis remains improved.  This means that she most likely has myasthenia gravis.  She is been receiving botulinum toxin injections and I told her that these are contraindicated in myasthenia gravis.  I will obtain a chest CT to look for thymoma.  After I gave her this information, she started to backtrack a little bit and indicated that perhaps the Mestinon was not that beneficial.  I have asked her to take photographs of her eyes on Mestinon and off of Mestinon to see if we can determine a difference or not.    She allegedly had an afferent pupillary defect at the last visit.  Her visual fields are much improved and her OCT remains normal.  Most likely this was an overcall.        Attending Physician Attestation:  Complete documentation of historical and exam elements from today's encounter can be found in the full encounter summary report (not reduplicated in this progress note).  I personally obtained the chief complaint(s) and history of present illness.  I confirmed and edited as necessary the review of systems, past medical/surgical history, family history, social history, and examination findings as documented by others; and I examined the patient myself.  I personally reviewed the relevant tests, images, and reports as documented above.  I formulated and edited as necessary the assessment and plan and discussed the findings and management plan with the patient and family. I personally reviewed the ophthalmic test(s) associated with this encounter, agree with the interpretation(s) as documented by the resident/fellow, and have edited the corresponding report(s) as necessary.  - Crow Moon, DO   PGY-5 Neuro-Ophthalmology Fellow

## 2022-06-05 ENCOUNTER — HEALTH MAINTENANCE LETTER (OUTPATIENT)
Age: 52
End: 2022-06-05

## 2022-06-28 DIAGNOSIS — G43.709 CHRONIC MIGRAINE WITHOUT AURA WITHOUT STATUS MIGRAINOSUS, NOT INTRACTABLE: Primary | ICD-10-CM

## 2022-06-28 RX ORDER — METOCLOPRAMIDE 10 MG/1
10 TABLET ORAL 3 TIMES DAILY PRN
Qty: 20 TABLET | Refills: 3 | Status: SHIPPED | OUTPATIENT
Start: 2022-06-28 | End: 2023-12-11

## 2022-07-29 ENCOUNTER — OFFICE VISIT (OUTPATIENT)
Dept: NEUROLOGY | Facility: CLINIC | Age: 52
End: 2022-07-29
Payer: MEDICARE

## 2022-07-29 VITALS
SYSTOLIC BLOOD PRESSURE: 132 MMHG | WEIGHT: 233.6 LBS | HEART RATE: 82 BPM | DIASTOLIC BLOOD PRESSURE: 86 MMHG | BODY MASS INDEX: 38.92 KG/M2 | HEIGHT: 65 IN | OXYGEN SATURATION: 96 %

## 2022-07-29 DIAGNOSIS — G43.709 CHRONIC MIGRAINE WITHOUT AURA WITHOUT STATUS MIGRAINOSUS, NOT INTRACTABLE: ICD-10-CM

## 2022-07-29 DIAGNOSIS — H02.403 PTOSIS OF BOTH EYELIDS: Primary | ICD-10-CM

## 2022-07-29 PROCEDURE — 99214 OFFICE O/P EST MOD 30 MIN: CPT | Performed by: PSYCHIATRY & NEUROLOGY

## 2022-07-29 ASSESSMENT — HEADACHE IMPACT TEST (HIT 6)
HOW OFTEN DO HEADACHES LIMIT YOUR DAILY ACTIVITIES: VERY OFTEN
HOW OFTEN DO HEADACHES LIMIT YOUR DAILY ACTIVITIES: VERY OFTEN
HOW OFTEN DID HEADACHS LIMIT CONCENTRATION ON WORK OR DAILY ACTIVITY: VERY OFTEN
HOW OFTEN HAVE YOU FELT TOO TIRED TO WORK BECAUSE OF YOUR HEADACHES: VERY OFTEN
HOW OFTEN DID HEADACHS LIMIT CONCENTRATION ON WORK OR DAILY ACTIVITY: VERY OFTEN
HOW OFTEN HAVE YOU FELT FED UP OR IRRITATED BECAUSE OF YOUR HEADACHES: VERY OFTEN
HIT6 TOTAL SCORE: 68
HIT6 TOTAL SCORE: 68
HOW OFTEN HAVE YOU FELT FED UP OR IRRITATED BECAUSE OF YOUR HEADACHES: VERY OFTEN
WHEN YOU HAVE HEADACHES HOW OFTEN IS THE PAIN SEVERE: VERY OFTEN
HOW OFTEN HAVE YOU FELT TOO TIRED TO WORK BECAUSE OF YOUR HEADACHES: VERY OFTEN
WHEN YOU HAVE A HEADACHE HOW OFTEN DO YOU WISH YOU COULD LIE DOWN: ALWAYS
WHEN YOU HAVE HEADACHES HOW OFTEN IS THE PAIN SEVERE: VERY OFTEN
WHEN YOU HAVE A HEADACHE HOW OFTEN DO YOU WISH YOU COULD LIE DOWN: ALWAYS

## 2022-07-29 NOTE — NURSING NOTE
"Camila Juarez is a 51 year old female who presents for:  Chief Complaint   Patient presents with     Headache     Headache f/u        Initial Vitals:  /86   Pulse 82   Ht 1.651 m (5' 5\")   Wt 106 kg (233 lb 9.6 oz)   SpO2 96%   BMI 38.87 kg/m   Estimated body mass index is 38.87 kg/m  as calculated from the following:    Height as of this encounter: 1.651 m (5' 5\").    Weight as of this encounter: 106 kg (233 lb 9.6 oz).. Body surface area is 2.2 meters squared. BP completed using cuff size: regular    Visit Facilitator    Maryana Suarez  "

## 2022-07-29 NOTE — PROGRESS NOTES
"[facility]  Neurology Progress Note    Subjective:    Ms. Juarez returns for follow-up of chronic migraine.  Since I last saw her, she contacted the clinic to let me know that she may have myasthenia gravis.  I recommended that she stop botulinum toxin injections and return to discuss alternative options for headache prevention.    Today, she reports that she continues to have ptosis, affecting the left eye more than the right.  She also describes times with difficulty swallowing or choking on her food.  This is intermittent and has not been seen on swallow study.  She also describes tripping on her feet at times, dropping cups she is holding in her hands.  She did fall onto her knee in recent weeks after 1 episode of tripping.    She has not found any pattern to weakness, but it is intermittent.  She is not able to determine if any activity or time of day affects weakness.    Today, she notes left-sided ptosis but is otherwise not having symptoms.    She has started Mestinon 60 mg 3 times daily.  She thinks this has been somewhat helpful for ptosis and swallowing.    Objective:    Vitals: /86   Pulse 82   Ht 1.651 m (5' 5\")   Wt 106 kg (233 lb 9.6 oz)   SpO2 96%   BMI 38.87 kg/m    General: Cooperative, NAD  Neurologic:  Mental Status: Fully alert, attentive and oriented. Speech clear and fluent.   Cranial Nerves: Left eye ptosis.  No notable worsening with sustained upward gaze.  Motor: No abnormal movements.  5/5 strength in upper and lower extremities.  No notable weakness with sustained arm raise.    Pertinent Investigations:    Acetylcholine receptor binding antibody: 0.0 (0.0-0.4)    Assessment/Plan:   Camila Juarez is a 51-year-old woman returns for follow-up of chronic migraine.  She previously did well with botulinum toxin injections for headache prevention, but I recommended that she stop these after there was concern for " myasthenia gravis.    Today, I can see that she had acetylcholine receptor binding antibody drawn.    -I have added a few additional antibody labs to evaluate for myasthenia.  I could not see that CT chest has been completed yet.  -She will continue Mestinon 60 mg 3 times daily.  I recommend that she be seen in the neuromuscular clinic for further evaluation.     -Stop Botox  -Start Emgality subcutaneous injection every 28 days starting with a loading dose for headache prevention.    I will plan to see her back in 3 months to monitor progress.    Brooke Morales MD  Neurology

## 2022-07-29 NOTE — LETTER
"    7/29/2022         RE: Camila Juarez  [address]  [facility] [address]        Dear Colleague,    Thank you for referring your patient, Camila Juarez, to the [facility] NEUROLOGY CLINICS [facility]. Please see a copy of my visit note below.    [facility]  Neurology Progress Note    Subjective:    Ms. Juarez returns for follow-up of chronic migraine.  Since I last saw her, she contacted the clinic to let me know that she may have myasthenia gravis.  I recommended that she stop botulinum toxin injections and return to discuss alternative options for headache prevention.    Today, she reports that she continues to have ptosis, affecting the left eye more than the right.  She also describes times with difficulty swallowing or choking on her food.  This is intermittent and has not been seen on swallow study.  She also describes tripping on her feet at times, dropping cups she is holding in her hands.  She did fall onto her knee in recent weeks after 1 episode of tripping.    She has not found any pattern to weakness, but it is intermittent.  She is not able to determine if any activity or time of day affects weakness.    Today, she notes left-sided ptosis but is otherwise not having symptoms.    She has started Mestinon 60 mg 3 times daily.  She thinks this has been somewhat helpful for ptosis and swallowing.    Objective:    Vitals: /86   Pulse 82   Ht 1.651 m (5' 5\")   Wt 106 kg (233 lb 9.6 oz)   SpO2 96%   BMI 38.87 kg/m    General: Cooperative, NAD  Neurologic:  Mental Status: Fully alert, attentive and oriented. Speech clear and fluent.   Cranial Nerves: Left eye ptosis.  No notable worsening with sustained upward gaze.  Motor: No abnormal movements.  5/5 strength in upper and lower extremities.  No notable weakness with sustained arm raise.    Pertinent Investigations:    Acetylcholine receptor binding antibody: 0.0 " (0.0-0.4)    Assessment/Plan:   Camila Juarez is a 51-year-old woman returns for follow-up of chronic migraine.  She previously did well with botulinum toxin injections for headache prevention, but I recommended that she stop these after there was concern for myasthenia gravis.    Today, I can see that she had acetylcholine receptor binding antibody drawn.    -I have added a few additional antibody labs to evaluate for myasthenia.  I could not see that CT chest has been completed yet.  -She will continue Mestinon 60 mg 3 times daily.  I recommend that she be seen in the neuromuscular clinic for further evaluation.     -Stop Botox  -Start Emgality subcutaneous injection every 28 days starting with a loading dose for headache prevention.    I will plan to see her back in 3 months to monitor progress.    Brooke Morales MD  Neurology         Again, thank you for allowing me to participate in the care of your patient.        Sincerely,        Brooke Morales MD

## 2022-08-01 ENCOUNTER — TELEPHONE (OUTPATIENT)
Dept: NEUROLOGY | Facility: CLINIC | Age: 52
End: 2022-08-01

## 2022-08-01 NOTE — TELEPHONE ENCOUNTER
Prior Authorization Approval    Authorization Effective Date: 7/2/2022  Authorization Expiration Date: 11/2/2022  Medication: galcanezumab-gnlm (EMGALITY) 120 MG/ML injection-APPROVED  Approved Dose/Quantity:   Reference #:     Insurance Company: [employer] - Phone [phone] Fax [phone]  Expected CoPay:       CoPay Card Available:      Foundation Assistance Needed:    Which Pharmacy is filling the prescription (Not needed for infusion/clinic administered): [facility] [address] [place], [place] - [address] [facility]  Pharmacy Notified: Yes  Patient Notified: No

## 2022-08-01 NOTE — TELEPHONE ENCOUNTER
Prior Authorization Retail Medication Request    Medication/Dose: galcanezumab-gnlm (EMGALITY) 120 MG/ML injection  ICD code (if different than what is on RX):    Previously Tried and Failed:  See Chart  Rationale:  See Chart    Insurance Name:  [employer]  Insurance ID:  [id number]      Pharmacy Information (if different than what is on RX)  Name:    Phone:

## 2022-08-01 NOTE — TELEPHONE ENCOUNTER
Central Prior Authorization Team   Phone: [phone]      [place] Initiation    Medication: galcanezumab-gnlm (EMGALITY) 120 MG/ML injection  Insurance Company: [employer] - Phone [phone] Fax [phone]  Pharmacy Filling the Rx: [facility] [address] [place], [place] - [address]  Filling Pharmacy Phone: [phone]  Filling Pharmacy Fax:    Start Date: 8/1/2022

## 2022-08-31 NOTE — TELEPHONE ENCOUNTER
RECORDS RECEIVED FROM: internal   REASON FOR VISIT: Ptosis of both eyelids, myasthenia gravis? ptosis and difficulty swallowing intermittently, Some response to mestinon   Date of Appt: 12/19/22   NOTES (FOR ALL VISITS) STATUS DETAILS   OFFICE NOTE from referring provider Internal Dr Brooke Morales @ [facility]:  7/29/22 5/2/22 2/7/22  (additional encounters)   DISCHARGE REPORT from the ER Internal [facility] [place]:  2/17/22   MEDICATION LIST Internal    IMAGING  (FOR ALL VISITS)     MRI (HEAD, NECK, SPINE) Internal [facility]:  MRA Brain COW 7/14/20  MRI Brain 6/21/20   CT (HEAD, NECK, SPINE) Internal [facility]:  CT Head 2/17/22  CT Head 6/21/20

## 2022-09-06 ENCOUNTER — LAB (OUTPATIENT)
Dept: LAB | Facility: CLINIC | Age: 52
End: 2022-09-06
Payer: MEDICARE

## 2022-09-06 DIAGNOSIS — H02.403 PTOSIS OF BOTH EYELIDS: ICD-10-CM

## 2022-09-06 PROCEDURE — 83516 IMMUNOASSAY NONANTIBODY: CPT | Mod: 90

## 2022-09-06 PROCEDURE — 99000 SPECIMEN HANDLING OFFICE-LAB: CPT

## 2022-09-06 PROCEDURE — 36415 COLL VENOUS BLD VENIPUNCTURE: CPT

## 2022-09-06 PROCEDURE — 86255 FLUORESCENT ANTIBODY SCREEN: CPT | Mod: 90

## 2022-09-06 PROCEDURE — 83519 RIA NONANTIBODY: CPT | Mod: 90

## 2022-09-09 LAB
ACHR BLOCK AB/ACHR TOTAL SFR SER: 10 %
STRIA MUS IGG SER QL IF: NORMAL

## 2022-09-10 LAB — MUSK AB SER-SCNC: 0 NMOL/L

## 2022-09-12 LAB — ACHR MOD AB/ACHR TOTAL SFR SER: 0 %

## 2022-09-15 LAB — ACHR BIND AB SER-SCNC: 0 NMOL/L

## 2022-10-16 ENCOUNTER — HEALTH MAINTENANCE LETTER (OUTPATIENT)
Age: 52
End: 2022-10-16

## 2022-11-04 ENCOUNTER — TELEPHONE (OUTPATIENT)
Dept: NEUROLOGY | Facility: CLINIC | Age: 52
End: 2022-11-04

## 2022-11-04 NOTE — TELEPHONE ENCOUNTER
Prior Authorization Specialty Medication Request    Medication/Dose: galcanezumab-gnlm (EMGALITY) 120 MG/ML injection  ICD code (if different than what is on RX):    Previously Tried and Failed:      Important Lab Values:   Rationale:     Insurance Name:   Insurance ID:   Insurance Phone Number:     Pharmacy Information (if different than what is on RX)  Name:    Phone:

## 2022-11-07 NOTE — TELEPHONE ENCOUNTER
Prior Authorization Approval    Authorization Effective Date: 10/8/2022  Authorization Expiration Date:  Until further notice  Medication: EMGALITY 120 MG/ML injection  Approved Dose/Quantity:   Reference #: [id number]   Insurance Company: [employer] - Phone [phone] Fax [phone]  Which Pharmacy is filling the prescription (Not needed for infusion/clinic administered): [facility] [address] [place] [address] [facility]  Pharmacy Notified: Yes  Patient Notified: Yes

## 2022-11-07 NOTE — TELEPHONE ENCOUNTER
[place] Initiation    Medication: EMGALITY 120 MG/ML injection  Insurance Company: [employer] - Phone [phone] Fax [phone]  Pharmacy Filling the Rx: [facility] [address] [place] [address] [id number] [place] [address] AVE  Filling Pharmacy Phone: [phone]  Filling Pharmacy Fax: [phone]  Start Date: 11/7/2022

## 2022-11-10 ENCOUNTER — VIRTUAL VISIT (OUTPATIENT)
Dept: NEUROLOGY | Facility: CLINIC | Age: 52
End: 2022-11-10
Payer: MEDICARE

## 2022-11-10 ENCOUNTER — TELEPHONE (OUTPATIENT)
Dept: NEUROLOGY | Facility: CLINIC | Age: 52
End: 2022-11-10

## 2022-11-10 DIAGNOSIS — G43.709 CHRONIC MIGRAINE WITHOUT AURA WITHOUT STATUS MIGRAINOSUS, NOT INTRACTABLE: Primary | ICD-10-CM

## 2022-11-10 PROCEDURE — 99213 OFFICE O/P EST LOW 20 MIN: CPT | Mod: 95 | Performed by: PSYCHIATRY & NEUROLOGY

## 2022-11-10 ASSESSMENT — HEADACHE IMPACT TEST (HIT 6)
HIT6 TOTAL SCORE: 78
WHEN YOU HAVE A HEADACHE HOW OFTEN DO YOU WISH YOU COULD LIE DOWN: ALWAYS
HOW OFTEN HAVE YOU FELT TOO TIRED TO WORK BECAUSE OF YOUR HEADACHES: ALWAYS
WHEN YOU HAVE HEADACHES HOW OFTEN IS THE PAIN SEVERE: ALWAYS
HOW OFTEN HAVE YOU FELT FED UP OR IRRITATED BECAUSE OF YOUR HEADACHES: ALWAYS
HOW OFTEN DID HEADACHS LIMIT CONCENTRATION ON WORK OR DAILY ACTIVITY: ALWAYS
HIT6 TOTAL SCORE: 78
WHEN YOU HAVE A HEADACHE HOW OFTEN DO YOU WISH YOU COULD LIE DOWN: ALWAYS
HOW OFTEN DO HEADACHES LIMIT YOUR DAILY ACTIVITIES: ALWAYS
HOW OFTEN HAVE YOU FELT FED UP OR IRRITATED BECAUSE OF YOUR HEADACHES: ALWAYS
HOW OFTEN HAVE YOU FELT TOO TIRED TO WORK BECAUSE OF YOUR HEADACHES: ALWAYS
HOW OFTEN DID HEADACHS LIMIT CONCENTRATION ON WORK OR DAILY ACTIVITY: ALWAYS
WHEN YOU HAVE HEADACHES HOW OFTEN IS THE PAIN SEVERE: ALWAYS
HOW OFTEN DO HEADACHES LIMIT YOUR DAILY ACTIVITIES: ALWAYS

## 2022-11-10 ASSESSMENT — MIGRAINE DISABILITY ASSESSMENT (MIDAS)
HOW OFTEN WERE SOCIAL ACTIVITIES MISSED DUE TO HEADACHES: 9
HOW MANY DAYS WAS YOUR PRODUCTIVITY CUT IN HALF BECAUSE OF HEADACHES: 0
HOW MANY DAYS DID YOU MISS WORK OR SCHOOL BECAUSE OF HEADACHES: 0
ON A SCALE FROM 0-10 ON AVERAGE HOW PAINFUL WERE HEADACHES: 10
HOW MANY DAYS DID YOU NOT DO HOUSEWORK BECAUSE OF HEADACHES: 9
HOW MANY DAYS WAS HOUSEWORK PRODUCTIVITY CUT IN HALF DUE TO HEADACHES: 9
HOW MANY DAYS IN THE PAST 3 MONTHS HAVE YOU HAD A HEADACHE: 9
TOTAL SCORE: 27

## 2022-11-10 NOTE — PROGRESS NOTES
Cheryl is a 52 year old who is being evaluated via a billable video visit.      How would you like to obtain your AVS? MyChart  If the video visit is dropped, the invitation should be resent by: Text to cell phone: [phone]  Will anyone else be joining your video visit? No        Video-Visit Details    Video Start Time: 3:31 PM    Type of service:  Video Visit    Video End Time:3:39 PM    Originating Location (pt. Location): Home        Distant Location (provider location):  Off-site    Platform used for Video Visit: [facility]    Headache Neurology Progress Note  November 10, 2022    Subjective:    Camila Juarez returns for follow up of chronic migraine.  At her last visit, I recommended that she switch from botulinum toxin injections to Emgality for migraine prevention.    She started Emgality after our last visit in July 2022. She reports that she was doing well for a few months, but this month's dose was delayed due to needing a new pre-authorization for Emgality.  She had a couple headaches recently, but overall feels that Emgality is still working very well for her.    She denies side effects.     She reports she has headaches occasionally, continuing to be triggered by stress. She estimates she has 1-2 bad headaches per month.  This is as good or better than she was doing on botulinum toxin injections.    For acute treatment, she lays down and takes metoclopramide as needed for headache and nausea.     She has an appointment in the neuromuscular clinic next month.    Objective:    Vitals: There were no vitals taken for this visit.  General: Cooperative, NAD  Neurologic:  Mental Status: Fully alert, attentive and oriented. Speech clear and fluent.   Cranial Nerves: Facial movements symmetric.  No ptosis noted over video.  Motor: No abnormal movements.      Assessment/Plan:   Camila Juarez is a 52 year old woman returns for follow-up of chronic migraine. She  switched from botulinum toxin injections to Emgality injections for headache prevention, after concern for myasthenia gravis.  She is planning to establish care in the neuromuscular clinic next month.     I recommend she continue Emgality subcutaneous injection every 28 days.  She will resume this as soon as she is able to  the prescription.     For acute treatment, she will continue metoclopramide 10 mg as needed for headache or nausea.  I will plan to see her back in 1 year, or sooner if needed.     Brooke Morales MD  Neurology

## 2022-11-10 NOTE — LETTER
11/10/2022         RE: Camila Juarez  [address]  [facility] [address]        Dear Colleague,    Thank you for referring your patient, Camila Juarez, to the [facility] NEUROLOGY CLINICS [facility]. Please see a copy of my visit note below.    Cheryl is a 52 year old who is being evaluated via a billable video visit.      How would you like to obtain your AVS? MyChart  If the video visit is dropped, the invitation should be resent by: Text to cell phone: [phone]  Will anyone else be joining your video visit? No        Video-Visit Details    Video Start Time: 3:31 PM    Type of service:  Video Visit    Video End Time:3:39 PM    Originating Location (pt. Location): Home        Distant Location (provider location):  Off-site    Platform used for Video Visit: [facility]    Headache Neurology Progress Note  November 10, 2022    Subjective:    Camila Juarez returns for follow up of chronic migraine.  At her last visit, I recommended that she switch from botulinum toxin injections to Emgality for migraine prevention.    She started Emgality after our last visit in July 2022. She reports that she was doing well for a few months, but this month's dose was delayed due to needing a new pre-authorization for Emgality.  She had a couple headaches recently, but overall feels that Emgality is still working very well for her.    She denies side effects.     She reports she has headaches occasionally, continuing to be triggered by stress. She estimates she has 1-2 bad headaches per month.  This is as good or better than she was doing on botulinum toxin injections.    For acute treatment, she lays down and takes metoclopramide as needed for headache and nausea.     She has an appointment in the neuromuscular clinic next month.    Objective:    Vitals: There were no vitals taken for this visit.  General: Cooperative, NAD  Neurologic:  Mental Status: Fully  alert, attentive and oriented. Speech clear and fluent.   Cranial Nerves: Facial movements symmetric.  No ptosis noted over video.  Motor: No abnormal movements.      Assessment/Plan:   Camila Juarez is a 52 year old woman returns for follow-up of chronic migraine. She switched from botulinum toxin injections to Emgality injections for headache prevention, after concern for myasthenia gravis.  She is planning to establish care in the neuromuscular clinic next month.     I recommend she continue Emgality subcutaneous injection every 28 days.  She will resume this as soon as she is able to  the prescription.     For acute treatment, she will continue metoclopramide 10 mg as needed for headache or nausea.  I will plan to see her back in 1 year, or sooner if needed.     Brooke Morales MD  Neurology         Again, thank you for allowing me to participate in the care of your patient.        Sincerely,        Brooke Morales MD

## 2022-12-19 ENCOUNTER — PRE VISIT (OUTPATIENT)
Dept: NEUROLOGY | Facility: CLINIC | Age: 52
End: 2022-12-19

## 2022-12-19 ENCOUNTER — OFFICE VISIT (OUTPATIENT)
Dept: NEUROLOGY | Facility: CLINIC | Age: 52
End: 2022-12-19
Attending: PSYCHIATRY & NEUROLOGY
Payer: MEDICARE

## 2022-12-19 VITALS
WEIGHT: 238 LBS | BODY MASS INDEX: 39.61 KG/M2 | OXYGEN SATURATION: 96 % | HEART RATE: 78 BPM | RESPIRATION RATE: 16 BRPM | DIASTOLIC BLOOD PRESSURE: 95 MMHG | SYSTOLIC BLOOD PRESSURE: 137 MMHG

## 2022-12-19 DIAGNOSIS — H02.403 PTOSIS OF BOTH EYELIDS: ICD-10-CM

## 2022-12-19 PROCEDURE — 99214 OFFICE O/P EST MOD 30 MIN: CPT | Mod: GC | Performed by: PSYCHIATRY & NEUROLOGY

## 2022-12-19 RX ORDER — ONDANSETRON 4 MG/1
TABLET, ORALLY DISINTEGRATING ORAL
COMMUNITY
Start: 2022-09-09

## 2022-12-19 RX ORDER — HYDROCODONE BITARTRATE AND IBUPROFEN 5; 200 MG/1; MG/1
1 TABLET ORAL 2 TIMES DAILY PRN
COMMUNITY
Start: 2022-12-02

## 2022-12-19 RX ORDER — QUETIAPINE FUMARATE 25 MG/1
25 TABLET, FILM COATED ORAL
COMMUNITY
Start: 2022-09-09

## 2022-12-19 RX ORDER — LIRAGLUTIDE 6 MG/ML
INJECTION SUBCUTANEOUS
COMMUNITY
Start: 2022-09-12

## 2022-12-19 RX ORDER — BACLOFEN 10 MG/1
TABLET ORAL
COMMUNITY
Start: 2022-09-09

## 2022-12-19 RX ORDER — CARIPRAZINE 1.5 MG/1
1.5 CAPSULE, GELATIN COATED ORAL DAILY
COMMUNITY
Start: 2022-09-12

## 2022-12-19 RX ORDER — CYCLOBENZAPRINE HCL 10 MG
TABLET ORAL
COMMUNITY
Start: 2022-01-27

## 2022-12-19 ASSESSMENT — PAIN SCALES - GENERAL: PAINLEVEL: MODERATE PAIN (4)

## 2022-12-19 NOTE — PATIENT INSTRUCTIONS
We're not sure whether you have a diagnosis of myasthenia gravis.  A single-fiber EMG test was ordered to see whether he have signs of myasthenia gravis.

## 2022-12-19 NOTE — LETTER
2022       RE: Camila Juarez  [id number]  [facility] [id number]     Dear Colleague,    Thank you for referring your patient, Camila Juarez, to the [facility] [place] at [facility]. Please see a copy of my visit note below.    [facility]: [place]  Neuromuscular Consult    Patient Name:  Camila Juarez  MRN:  [id number]    :  1970  Date of Service:  2022  Primary care provider:  Mor Lopez      Reason for Consult: Asked by Dr. Morales to see Camila Juarez for ptosis.    History of Present Illness:   52 year old female with h/o migraine headaches who presents for ptosis.  She presented alone to today's appointment.    She reports longstanding history of left eye weakness and around .  This weakness was associated with migraine headaches.  She reports when her migraines were most severe, the entire left face will droop to the point that others thought that she had a stroke.  The last such episode of experiencing her entire left face drooping was approximately 2 years ago.  In addition, she has independent episodes of left eye heaviness that occur on a daily basis.  There is no predilection for a specific time of day such as morning or evening.  However, if she has a severe migraine then her left eye ptosis is so severe that it is almost closed.  She also notices diplopia intermittently, unclear if monocular or binocular.  She reports that her vision is generally better in her right eye.  She reports pyridostigmine use over at least the past year.  She appeared hesitant when asked whether this was helpful for high weakness, on later questioning believed pyridostigmine was helpful and that if she missed a dose the eyelid droop would be worse.    Regarding swallowing, she reports she gags approximately twice weekly on both solids and liquids.  She  had a swallow study and was told that this was normal.    Regarding limb weakness, she reports that her right leg feels weaker than her left.  She attributes this weakness to presence of chronic hip injury as well as lumbar spine radiculopathy.  She typically trips once weekly, has fallen once or twice monthly.  Last fall event was 2 weeks ago outside while walking her dog, she preemptively tripped onto snow in order to prevent a more severe injury.  She previously was told to use a cane but does not currently use a cane.  She has chronic pain and states she will be getting a trial of transmitter tomorrow in anticipation of spinal cord stimulator for chronic pain.      Per chart review, she had received trigger point injections for chronic upper back, neck, and head pain by Dr. Ha at [facility] on 1/21/2011, 5/20/2011, 7/16/2011, and 8/5/2013.  She reported left eye blurriness and pain associated with migraine 5/28/2013 with her primary care provider.  Brain MRI at that time was negative for abnormal enhancement (ordered with indication of left facial weakness).  She had reported chronic left facial droop and left sided ptosis in June 2020.  An icepack test was negative in ophthalmology clinic 7/16/2020 when administered for left eye ptosis.  Pyridostigmine was started 7/16/2020 after this icepack test.  Botox injections for migraine were administered 8/31/2020 through 5/2/2022.  After increased concern for myasthenia gravis, Botox injections were discontinued and chronic preventative treatment was switched to Emgality for management of migraine headache.    ROS: A 10-point ROS was performed as per HPI.    PMH:  Past Medical History:   Diagnosis Date     Anxiety      Back pain      Depressive disorder      Diabetes (H)      Endometriosis      Fibromyalgia      Gastroesophageal reflux disease      Hypertension      Migraine      Rheumatoid arthritis with rheumatoid factor (H)      Past Surgical  History:   Procedure Laterality Date     BACK SURGERY       GYN SURGERY      hysterectomy     HYSTERECTOMY       LAPAROSCOPIC APPENDECTOMY N/A 3/27/2015    Procedure: LAPAROSCOPIC APPENDECTOMY;  Surgeon: Ryan Fiore MD;  Location: [facility] OR     ORTHOPEDIC SURGERY         Allergies:  Allergies   Allergen Reactions     Penicillins Anaphylaxis     Patient previously tolerated cephalosporins and Amoxicillin/Augmentin per patient report after PCN reaction     Compazine [Prochlorperazine]      Morphine Hives     Tramadol      Sulfa Drugs Rash       Medications:      Current Outpatient Medications:      baclofen (LIORESAL) 10 MG tablet, TAKE 1 TABLET BY MOUTH THREE TIMES DAILY AS NEEDED FOR PAIN (BACK PAIN), Disp: , Rfl:      busPIRone (BUSPAR) 10 MG tablet, Take 10 mg by mouth 2 times daily , Disp: , Rfl:      cyclobenzaprine (FLEXERIL) 10 MG tablet, TAKE 1 TABLET BY MOUTH TWICE DAILY FOR 28 DAYS, Disp: , Rfl:      gabapentin (NEURONTIN) 800 MG tablet, Take 800 mg by mouth 4 times daily, Disp: , Rfl:      galcanezumab-gnlm (EMGALITY) 120 MG/ML injection, Inject 1 mL (120 mg) Subcutaneous every 28 days, Disp: 1 mL, Rfl: 11     HYDROcodone-Ibuprofen 5-200 MG TABS, Take 1 tablet by mouth 2 times daily as needed, Disp: , Rfl:      LORazepam (ATIVAN) 1 MG tablet, Take 1 tablet (1 mg) by mouth once as needed for anxiety (pre-MRA, repeat once if needed), Disp: 2 tablet, Rfl: 0     meloxicam (MOBIC) 15 MG tablet, Take 15 mg by mouth daily , Disp: , Rfl:      mirtazapine (REMERON) 15 MG tablet, , Disp: , Rfl:      modafinil (PROVIGIL) 200 MG tablet, Take 200 mg by mouth daily , Disp: , Rfl:      ondansetron (ZOFRAN ODT) 4 MG ODT tab, DISSOLVE 1 TABLET IN MOUTH EVERY 8 HOURS AS NEEDED FOR NAUSEA AND VOMITING, Disp: , Rfl:      oxyCODONE (XTAMPZA) 9 MG 12 hr tablet, Take 9 mg by mouth every 12 hours Takes twice per day., Disp: , Rfl:      pantoprazole (PROTONIX) 40 MG EC tablet, Take 40 mg by mouth daily as needed, Disp: ,  Rfl:      QUEtiapine (SEROQUEL) 25 MG tablet, Take 25 mg by mouth, Disp: , Rfl:      simvastatin (ZOCOR) 80 MG tablet, Take 80 mg by mouth At Bedtime , Disp: , Rfl:      venlafaxine (EFFEXOR-XR) 150 MG 24 hr capsule, Take 150 mg by mouth daily With 75mg capsule for total dose = 225mg, Disp: , Rfl:      VICTOZA PEN 18 MG/3ML soln, INJECT 1.8 MG SUBCUTANEOUS ONCE DAILY, Disp: , Rfl:      vitamin D2 (ERGOCALCIFEROL) 89064 units (1250 mcg) capsule, Take 50,000 Units by mouth every 7 days On Mon, Disp: , Rfl:      VRAYLAR 1.5 MG capsule, Take 1.5 mg by mouth daily, Disp: , Rfl:      Botulinum Toxin Type A (BOTOX) 200 units injection, Inject 200 Units into the muscle every 3 months (Patient not taking: Reported on 11/10/2022), Disp: 200 Units, Rfl: 11     dihydroergotamine (MIGRANAL) 4 MG/ML nasal spray, Spray 1 spray into both nostrils as needed for migraine Use in one nostril as directed.  No more than 4 sprays in one hour. (Patient not taking: Reported on 11/10/2022), Disp: 1 mL, Rfl: 0     LORazepam (ATIVAN) 0.5 MG tablet, Take 0.5 mg by mouth every 6 hours as needed for anxiety (Patient not taking: Reported on 11/10/2022), Disp: , Rfl:      metoclopramide (REGLAN) 10 MG tablet, Take 1 tablet (10 mg) by mouth 3 times daily as needed (migraine) (Patient not taking: Reported on 11/10/2022), Disp: 20 tablet, Rfl: 3     pyridostigmine (MESTINON) 60 MG tablet, Take 1 tablet (60 mg) by mouth 3 times daily (Patient not taking: Reported on 11/10/2022), Disp: 270 tablet, Rfl: 0     traZODone (DESYREL) 150 MG tablet, Take 150 mg by mouth At Bedtime (Patient not taking: Reported on 11/10/2022), Disp: , Rfl:      venlafaxine (EFFEXOR-XR) 75 MG 24 hr capsule, Take 75 mg by mouth daily With 150mg capsule for total dose = 225mg (Patient not taking: Reported on 11/10/2022), Disp: , Rfl:     Current Facility-Administered Medications:      Botulinum Toxin Type A (BOTOX) 200 units injection 150 Units, 150 Units, Intramuscular, See Admin  Instructions, Brooke Morales MD, 150 Units at 05/02/22 1516     Botulinum Toxin Type A (BOTOX) 200 units injection 150 Units, 150 Units, Intramuscular, See Admin Instructions, Brooke Morales MD, 150 Units at 02/07/22 1425     Botulinum Toxin Type A (BOTOX) 200 units injection 150 Units, 150 Units, Intramuscular, See Admin Instructions, Brooke Morales MD, 150 Units at 08/30/21 1241    Social History:  Social History     Tobacco Use     Smoking status: Every Day     Packs/day: 0.25     Types: Cigarettes     Smokeless tobacco: Never   Substance Use Topics     Alcohol use: No   She is currently on disability.  She lives with her youngest daughter and 2 grandchildren.  She consumes alcohol occasionally.  She smokes 5 packs/day.  She denies recreational drug use.    Family History:    No family history on file.  She denies family history of neuromuscular disease.    Physical Examination:   BP (!) 137/95   Pulse 78   Resp 16   Wt 108 kg (238 lb)   SpO2 96%   BMI 39.61 kg/m    General: pt sitting comfortably in chair not in acute distress, notes presence of headache when room lights are brightened  HEENT: no icterus  Chest: not in respiratory distress in room air  Ext: no edema  Skin: no rashes  Neuro:   Exam performed approximately 9 hours after last pyridostigmine dose  -MS: alert, speech fluent  -CN: pupils equal round reactive to light, reports monocular horizontal diplopia upon prolonged upgaze (diplopia more noted in right eye), monocular horizontal diplopia also noted upon lateral gaze to each side, no ptosis with prolonged upgaze, no clear weakness of orbicularis oculi both before and after prolonged upgaze (evaluation mildly limited by effort), facial sensation reportedly diminished to light touch on left side, hearing intact to conversation, palate raises symmetrically, shoulder shrug strong, tongue midline  -Motor: Normal bulk, normal tone, strength exam below mildly limited by giveaway  "weakness   Right Left   Neck flexion 5    Neck extension: 5    Shoulder abduction:  5 5   Elbow Flexion: 5 5   Elbow Extension:  5 5   Wrist Extension:  5 5   Finger Extension:  5 5   FDI 5 5   Thumb abduction 5 5   Finger Flexion 5 5   Hip Flexion 5 5   Knee Extension 5 5   Knee Flexion 5 5   Dorsiflexion 5 5   Plantar flexion 5 5   Foot Inversion 5 5   Foot Eversion 5 5      Deep tendon reflexes:   Right Left   Triceps 2 2   Biceps 2 2   Brachioradialis 2 2   Knee jerk 3 3   Ankle jerk 3 3   Plantar responses were flexor bilaterally.  No laguerre's.  -Sensory: Reportedly diminished to light touch throughout the left side.  Pinprick reportedly dull throughout.  Vibratory sensation intact at fingers, 3 seconds right toe, 7 seconds left toe, 5 seconds right ankle, 10 seconds left ankle, 8 seconds right knee, greater than 10 seconds left knee.  -Coordination: intact to FNF bilaterally  -Gait: Antalgic gait. Able to briefly perform heel and toe walk.  Several steps of tandem gait performed with moderate difficulty, limited by right hip pain.    Investigations:    BMP unremarkable 9/9/2022  CBC unremarkable 2/17/2022    MuSK antibody negative 9/6/2022  AChR binding, blocking, and modulating antibodies negative 9/6/2022  AChR binding antibody negative 8/3/2020  Striated muscle antibody negative 9/6/2022    A1C 6.1% 9/9/2022  Vitamin D 32.2 on 4/26/2022  TSH 1.05 9/2/2021    MRI brain without/with contrast 6/21/2020:  \"1.  No evidence of acute infarction or other acute intracranial abnormality.  2.  Findings compatible with mild chronic small vessel ischemic change.\"    MRA brain 7/14/2020:  \"Impression:  No definite aneurysm or stenosis of the major  intracranial arteries.\"    Impression:    Camila Juarez is a 52 year old woman who presents in consultation for eyelid droop.  She reports presence of left facial droop associated with migraines.  It is unclear whether the frequent occurrence of left eyelid droop is due " to myasthenia gravis.  Her exam today (more than 8 hours after last pyridostigmine dose) did not exhibit fatigable ptosis, and prior documented icepack test before starting pyridostigmine was negative.  Prior serum studies as noted above were negative for antibodies typically associated with myasthenia gravis.  We discussed that it would be worthwhile to pursue single-fiber EMG to clarify whether she has a neuromuscular junction abnormality.  If negative, it is less likely that she has underlying myasthenia gravis and pyridostigmine would not likely be beneficial.    Plan:   - Single-fiber EMG  - If negative, will discuss pyridostigmine discontinuation at that time    Patient discussed with attending neurologist, Dr. Reyna, who agrees with above.    Sally Bustamante MD  CNP Fellow    ATTENDING ADDENDUM: Patient seen and examined with Fellow Dr Bustamante at the [facility] on 12/19/2022. I agree with her assessment and plan. I am not clinically convinced that Mrs Juarez has ocular MG. Her exam does not show fatigable ptosis and previous ice pack test was negative. Her diplopia appears monocular which suggests anterior eye chamber problems (dry eyes, corneal, cataracts, etc) or functional disorder. Myasthenic diplopia should be binocular. I will get electrophysiologic studies- she needs to stop Mestinon 24 hours prior. If negative I would dismiss the diagnosis.   Billing MDM level 4 (moderate) based on 1) Problems assessed: one new undiagnosed problem with uncertain prognosis (ptosis, etiology unclear) and 2) Amount/Complexity: Review of the results of 3 or more unique tests, see above.    Celso Reyna MD

## 2022-12-19 NOTE — PROGRESS NOTES
[facility]: [place]  Neuromuscular Consult    Patient Name:  Camila Juarez  MRN:  [id number]    :  1970  Date of Service:  2022  Primary care provider:  Mor Lopez      Reason for Consult: Asked by Dr. Morales to see Camila Juarez for ptosis.    History of Present Illness:   52 year old female with h/o migraine headaches who presents for ptosis.  She presented alone to today's appointment.    She reports longstanding history of left eye weakness and around .  This weakness was associated with migraine headaches.  She reports when her migraines were most severe, the entire left face will droop to the point that others thought that she had a stroke.  The last such episode of experiencing her entire left face drooping was approximately 2 years ago.  In addition, she has independent episodes of left eye heaviness that occur on a daily basis.  There is no predilection for a specific time of day such as morning or evening.  However, if she has a severe migraine then her left eye ptosis is so severe that it is almost closed.  She also notices diplopia intermittently, unclear if monocular or binocular.  She reports that her vision is generally better in her right eye.  She reports pyridostigmine use over at least the past year.  She appeared hesitant when asked whether this was helpful for high weakness, on later questioning believed pyridostigmine was helpful and that if she missed a dose the eyelid droop would be worse.    Regarding swallowing, she reports she gags approximately twice weekly on both solids and liquids.  She had a swallow study and was told that this was normal.    Regarding limb weakness, she reports that her right leg feels weaker than her left.  She attributes this weakness to presence of chronic hip injury as well as lumbar spine radiculopathy.  She typically trips once weekly, has fallen once or twice monthly.  Last fall event  was 2 weeks ago outside while walking her dog, she preemptively tripped onto snow in order to prevent a more severe injury.  She previously was told to use a cane but does not currently use a cane.  She has chronic pain and states she will be getting a trial of transmitter tomorrow in anticipation of spinal cord stimulator for chronic pain.      Per chart review, she had received trigger point injections for chronic upper back, neck, and head pain by Dr. Ha at [facility] on 1/21/2011, 5/20/2011, 7/16/2011, and 8/5/2013.  She reported left eye blurriness and pain associated with migraine 5/28/2013 with her primary care provider.  Brain MRI at that time was negative for abnormal enhancement (ordered with indication of left facial weakness).  She had reported chronic left facial droop and left sided ptosis in June 2020.  An icepack test was negative in ophthalmology clinic 7/16/2020 when administered for left eye ptosis.  Pyridostigmine was started 7/16/2020 after this icepack test.  Botox injections for migraine were administered 8/31/2020 through 5/2/2022.  After increased concern for myasthenia gravis, Botox injections were discontinued and chronic preventative treatment was switched to Emgality for management of migraine headache.    ROS: A 10-point ROS was performed as per HPI.    PMH:  Past Medical History:   Diagnosis Date     Anxiety      Back pain      Depressive disorder      Diabetes (H)      Endometriosis      Fibromyalgia      Gastroesophageal reflux disease      Hypertension      Migraine      Rheumatoid arthritis with rheumatoid factor (H)      Past Surgical History:   Procedure Laterality Date     BACK SURGERY       GYN SURGERY      hysterectomy     HYSTERECTOMY       LAPAROSCOPIC APPENDECTOMY N/A 3/27/2015    Procedure: LAPAROSCOPIC APPENDECTOMY;  Surgeon: Ryan Fiore MD;  Location: [facility] OR     ORTHOPEDIC SURGERY         Allergies:  Allergies   Allergen Reactions      Penicillins Anaphylaxis     Patient previously tolerated cephalosporins and Amoxicillin/Augmentin per patient report after PCN reaction     Compazine [Prochlorperazine]      Morphine Hives     Tramadol      Sulfa Drugs Rash       Medications:      Current Outpatient Medications:      baclofen (LIORESAL) 10 MG tablet, TAKE 1 TABLET BY MOUTH THREE TIMES DAILY AS NEEDED FOR PAIN (BACK PAIN), Disp: , Rfl:      busPIRone (BUSPAR) 10 MG tablet, Take 10 mg by mouth 2 times daily , Disp: , Rfl:      cyclobenzaprine (FLEXERIL) 10 MG tablet, TAKE 1 TABLET BY MOUTH TWICE DAILY FOR 28 DAYS, Disp: , Rfl:      gabapentin (NEURONTIN) 800 MG tablet, Take 800 mg by mouth 4 times daily, Disp: , Rfl:      galcanezumab-gnlm (EMGALITY) 120 MG/ML injection, Inject 1 mL (120 mg) Subcutaneous every 28 days, Disp: 1 mL, Rfl: 11     HYDROcodone-Ibuprofen 5-200 MG TABS, Take 1 tablet by mouth 2 times daily as needed, Disp: , Rfl:      LORazepam (ATIVAN) 1 MG tablet, Take 1 tablet (1 mg) by mouth once as needed for anxiety (pre-MRA, repeat once if needed), Disp: 2 tablet, Rfl: 0     meloxicam (MOBIC) 15 MG tablet, Take 15 mg by mouth daily , Disp: , Rfl:      mirtazapine (REMERON) 15 MG tablet, , Disp: , Rfl:      modafinil (PROVIGIL) 200 MG tablet, Take 200 mg by mouth daily , Disp: , Rfl:      ondansetron (ZOFRAN ODT) 4 MG ODT tab, DISSOLVE 1 TABLET IN MOUTH EVERY 8 HOURS AS NEEDED FOR NAUSEA AND VOMITING, Disp: , Rfl:      oxyCODONE (XTAMPZA) 9 MG 12 hr tablet, Take 9 mg by mouth every 12 hours Takes twice per day., Disp: , Rfl:      pantoprazole (PROTONIX) 40 MG EC tablet, Take 40 mg by mouth daily as needed, Disp: , Rfl:      QUEtiapine (SEROQUEL) 25 MG tablet, Take 25 mg by mouth, Disp: , Rfl:      simvastatin (ZOCOR) 80 MG tablet, Take 80 mg by mouth At Bedtime , Disp: , Rfl:      venlafaxine (EFFEXOR-XR) 150 MG 24 hr capsule, Take 150 mg by mouth daily With 75mg capsule for total dose = 225mg, Disp: , Rfl:      VICTOZA PEN 18 MG/3ML  soln, INJECT 1.8 MG SUBCUTANEOUS ONCE DAILY, Disp: , Rfl:      vitamin D2 (ERGOCALCIFEROL) 15607 units (1250 mcg) capsule, Take 50,000 Units by mouth every 7 days On Mon, Disp: , Rfl:      VRAYLAR 1.5 MG capsule, Take 1.5 mg by mouth daily, Disp: , Rfl:      Botulinum Toxin Type A (BOTOX) 200 units injection, Inject 200 Units into the muscle every 3 months (Patient not taking: Reported on 11/10/2022), Disp: 200 Units, Rfl: 11     dihydroergotamine (MIGRANAL) 4 MG/ML nasal spray, Spray 1 spray into both nostrils as needed for migraine Use in one nostril as directed.  No more than 4 sprays in one hour. (Patient not taking: Reported on 11/10/2022), Disp: 1 mL, Rfl: 0     LORazepam (ATIVAN) 0.5 MG tablet, Take 0.5 mg by mouth every 6 hours as needed for anxiety (Patient not taking: Reported on 11/10/2022), Disp: , Rfl:      metoclopramide (REGLAN) 10 MG tablet, Take 1 tablet (10 mg) by mouth 3 times daily as needed (migraine) (Patient not taking: Reported on 11/10/2022), Disp: 20 tablet, Rfl: 3     pyridostigmine (MESTINON) 60 MG tablet, Take 1 tablet (60 mg) by mouth 3 times daily (Patient not taking: Reported on 11/10/2022), Disp: 270 tablet, Rfl: 0     traZODone (DESYREL) 150 MG tablet, Take 150 mg by mouth At Bedtime (Patient not taking: Reported on 11/10/2022), Disp: , Rfl:      venlafaxine (EFFEXOR-XR) 75 MG 24 hr capsule, Take 75 mg by mouth daily With 150mg capsule for total dose = 225mg (Patient not taking: Reported on 11/10/2022), Disp: , Rfl:     Current Facility-Administered Medications:      Botulinum Toxin Type A (BOTOX) 200 units injection 150 Units, 150 Units, Intramuscular, See Admin Instructions, Brooke Morales MD, 150 Units at 05/02/22 1516     Botulinum Toxin Type A (BOTOX) 200 units injection 150 Units, 150 Units, Intramuscular, See Admin Instructions, Brooke Morales MD, 150 Units at 02/07/22 1425     Botulinum Toxin Type A (BOTOX) 200 units injection 150 Units, 150 Units, Intramuscular,  See Admin Instructions, Brooke Morales MD, 150 Units at 08/30/21 1241    Social History:  Social History     Tobacco Use     Smoking status: Every Day     Packs/day: 0.25     Types: Cigarettes     Smokeless tobacco: Never   Substance Use Topics     Alcohol use: No   She is currently on disability.  She lives with her youngest daughter and 2 grandchildren.  She consumes alcohol occasionally.  She smokes 5 packs/day.  She denies recreational drug use.    Family History:    No family history on file.  She denies family history of neuromuscular disease.    Physical Examination:   BP (!) 137/95   Pulse 78   Resp 16   Wt 108 kg (238 lb)   SpO2 96%   BMI 39.61 kg/m    General: pt sitting comfortably in chair not in acute distress, notes presence of headache when room lights are brightened  HEENT: no icterus  Chest: not in respiratory distress in room air  Ext: no edema  Skin: no rashes  Neuro:   Exam performed approximately 9 hours after last pyridostigmine dose  -MS: alert, speech fluent  -CN: pupils equal round reactive to light, reports monocular horizontal diplopia upon prolonged upgaze (diplopia more noted in right eye), monocular horizontal diplopia also noted upon lateral gaze to each side, no ptosis with prolonged upgaze, no clear weakness of orbicularis oculi both before and after prolonged upgaze (evaluation mildly limited by effort), facial sensation reportedly diminished to light touch on left side, hearing intact to conversation, palate raises symmetrically, shoulder shrug strong, tongue midline  -Motor: Normal bulk, normal tone, strength exam below mildly limited by giveaway weakness   Right Left   Neck flexion 5    Neck extension: 5    Shoulder abduction:  5 5   Elbow Flexion: 5 5   Elbow Extension:  5 5   Wrist Extension:  5 5   Finger Extension:  5 5   FDI 5 5   Thumb abduction 5 5   Finger Flexion 5 5   Hip Flexion 5 5   Knee Extension 5 5   Knee Flexion 5 5   Dorsiflexion 5 5   Plantar flexion  "5 5   Foot Inversion 5 5   Foot Eversion 5 5      Deep tendon reflexes:   Right Left   Triceps 2 2   Biceps 2 2   Brachioradialis 2 2   Knee jerk 3 3   Ankle jerk 3 3   Plantar responses were flexor bilaterally.  No laguerre's.  -Sensory: Reportedly diminished to light touch throughout the left side.  Pinprick reportedly dull throughout.  Vibratory sensation intact at fingers, 3 seconds right toe, 7 seconds left toe, 5 seconds right ankle, 10 seconds left ankle, 8 seconds right knee, greater than 10 seconds left knee.  -Coordination: intact to FNF bilaterally  -Gait: Antalgic gait. Able to briefly perform heel and toe walk.  Several steps of tandem gait performed with moderate difficulty, limited by right hip pain.    Investigations:    BMP unremarkable 9/9/2022  CBC unremarkable 2/17/2022    MuSK antibody negative 9/6/2022  AChR binding, blocking, and modulating antibodies negative 9/6/2022  AChR binding antibody negative 8/3/2020  Striated muscle antibody negative 9/6/2022    A1C 6.1% 9/9/2022  Vitamin D 32.2 on 4/26/2022  TSH 1.05 9/2/2021    MRI brain without/with contrast 6/21/2020:  \"1.  No evidence of acute infarction or other acute intracranial abnormality.  2.  Findings compatible with mild chronic small vessel ischemic change.\"    MRA brain 7/14/2020:  \"Impression:  No definite aneurysm or stenosis of the major  intracranial arteries.\"    Impression:    Camila Juarez is a 52 year old woman who presents in consultation for eyelid droop.  She reports presence of left facial droop associated with migraines.  It is unclear whether the frequent occurrence of left eyelid droop is due to myasthenia gravis.  Her exam today (more than 8 hours after last pyridostigmine dose) did not exhibit fatigable ptosis, and prior documented icepack test before starting pyridostigmine was negative.  Prior serum studies as noted above were negative for antibodies typically associated with myasthenia gravis.  We discussed that " it would be worthwhile to pursue single-fiber EMG to clarify whether she has a neuromuscular junction abnormality.  If negative, it is less likely that she has underlying myasthenia gravis and pyridostigmine would not likely be beneficial.    Plan:   - Single-fiber EMG  - If negative, will discuss pyridostigmine discontinuation at that time    Patient discussed with attending neurologist, Dr. Reyna, who agrees with above.    Sally Bustamante MD  CNP Fellow    ATTENDING ADDENDUM: Patient seen and examined with Fellow Dr Bustamante at the [facility] on 12/19/2022. I agree with her assessment and plan. I am not clinically convinced that Mrs Juarez has ocular MG. Her exam does not show fatigable ptosis and previous ice pack test was negative. Her diplopia appears monocular which suggests anterior eye chamber problems (dry eyes, corneal, cataracts, etc) or functional disorder. Myasthenic diplopia should be binocular. I will get electrophysiologic studies- she needs to stop Mestinon 24 hours prior. If negative I would dismiss the diagnosis.   Billing MDM level 4 (moderate) based on 1) Problems assessed: one new undiagnosed problem with uncertain prognosis (ptosis, etiology unclear) and 2) Amount/Complexity: Review of the results of 3 or more unique tests, see above.    Celso Reyna MD

## 2023-01-02 ENCOUNTER — DOCUMENTATION ONLY (OUTPATIENT)
Dept: NEUROLOGY | Facility: CLINIC | Age: 53
End: 2023-01-02

## 2023-03-26 ENCOUNTER — HEALTH MAINTENANCE LETTER (OUTPATIENT)
Age: 53
End: 2023-03-26

## 2023-06-05 ENCOUNTER — OFFICE VISIT (OUTPATIENT)
Dept: NEUROLOGY | Facility: CLINIC | Age: 53
End: 2023-06-05
Payer: MEDICARE

## 2023-06-05 DIAGNOSIS — H02.403 PTOSIS OF BOTH EYELIDS: ICD-10-CM

## 2023-06-05 PROCEDURE — 95872 NDL EMG SINGLE FIBER ELTRD: CPT | Performed by: PSYCHIATRY & NEUROLOGY

## 2023-06-05 NOTE — LETTER
2023       RE: Camila Juarez  [id number]  [facility] [address]     Dear Colleague,    Thank you for referring your patient, Camila Juarez, to the [facility] [place] at [facility]. Please see a copy of my visit note below.                        [facility]  Electrodiagnostic Laboratory                 Department of Neurology                                                                                                         Test Date:  2023    Patient: Cheryl Juarez : 1970 Physician: Ren Jessica MD   Sex: Female AGE: 52 year Ref Phys:    ID#: [id number]   Technician:      History & Examination: This 52 year-old woman presents with an isolated left-sided ptosis of the eye lid in the context long-standing history of migraine and history of treatment with Botox. This study was requested to evaluate status of neuromuscular transmission to evaluate the possibility of patient having myasthenia gravis.    Techniques:  Single fiber EMG was done with a disposable Ambu Neuroline 25g concentric needle electrode, and voluntary activation of motor units.   Summary of reference values for CNE jitter studies    Voluntary activation:  OO   Frontalis  EDC    Individual 45   38   43    Mean  31   28   30  Abnormal if 3 outliers - 20 pairs and 4 outliers - 30 pairs    Reference:  Jeronimo Hurst et al. Reference values for jitter recorded by concentric needle electrodes in healthy controls: A multicenter study. 2016. Muscle Nerve 53: 351-362      Results:  Single fiber EMG was of a good technical quality.  The left orbicularis oculi and frontalis muscle was studied with 8 and 19 individual pairs. All orbicularis oculi muscle individual pairs demonstrated jitter ranging from 15  s to 32  s (<45  s).  The mean jitter of the 8 pairs was 26.6  s (nl< 31  s). There was no transmission blocking.  All frontalis  muscle individual pairs demonstrated jitter ranging from 17  s to 57  s (<38  s).  The mean jitter of the 19 pairs was 34.5  s (nl< 28  s). There was no transmission blocking.    Interpretation:  This an abnormal study.  There is mild or borderline abnormality in the neuromuscular transmission with involvement only frontalis muscle (see comment).    Comment: This study demonstrates equivocal results with minimal involvement of the frontalis muscle. There is no NMJ abnormality in the orbicularis oculi muscle. While patchy involvement is common in myasthenia gravis such difference between the muscles could due to proximity of botox injection sites with frontalis muscles. Clinical correlation is required.        Ren Jessica MD  Neuromuscular medicine            SFEMG+     Run  Samples  Blocks  IPI[ s] Jitter[ s] Jitter Norm[ s] LAWANDA[ s] MSD[ s] Freq[Hz]   Right Frontalis   1.1 87 No 792 32.4 <38 32.4 32.9 31.1   2.1 49 No 743 31.2  31.2 36.7 25.9   4.1 79 No 773 35.7  35.7 37.4 27.5   5.1 55 No 780 27.7  28.7 27.7 25.8   7.1 52 No 677 25.6  25.6 34.2 11.5   8.1 70 No 586 34.8  34.8 35.7 24.7   9.1 152 No 431 23.3  23.3 39.0 13.7   10.1 61 No 1116 40.4  40.4 49.0 18.6   11.1 58 No 1482 40.9  40.9 41.4 18.4   12.1 75 No 424 31.4  31.4 34.1 32.2   13.1 70 No 542 17.4  17.4 20.2 31.1   14.1 100 No 395 37.4  40.6 37.4 21.1   15.1 55 No 706 38.7  38.7 42.2 36.1   16.2 69 No 284 33.2  33.2 37.7 16.1   17.1 102 No 854 36.5  39.5 36.5 26.8   17.2 104 No 453 33.8  34.3 33.8 26.8   21.1 93 No 747 41.7  43.1 41.7 30.8   22.2 118 No 357 57.2  57.2 62.9 19.1   23.1 71 No 1942 36.3  36.8 36.3 14.7   Mean     741 34.5 <28.0 35.0 37.7 24.3   SD     406 8.32   8.51 8.47 6.57   Potential Pairs:19     Increased Jitter:0.0 %  Normal Pairs: 100 %   Jitter:               34.5  Blocks:             0.0 %  Fiber Density:-   Right Orbicularis Oculi   1.1 45 No 472 28.6 <45 28.6 51.6 20.2   3.1 32 No 1242 31.7  31.7 35.9 13.5   4.1 47 No  1331 28.3  28.3 35.1 18.5   6.1 74 No 364 33.2  33.2 38.6 21.7   7.1 45 No 334 24.2  24.2 29.5 25.0   8.1 41 No 384 26.7  26.7 29.7 18.5   9.1 117 No 604 24.9  24.9 27.5 14.2   10.1 199 No 735 14.9  14.9 16.1 13.4   Mean     683 26.6 <31 26.6 33.0 17.9   SD     396 5.63   5.63 10.2 3.76   Potential Pairs:8       Increased Jitter:0.0 %  Normal Pairs: 100 %   Jitter:               26.6  Blocks:             0.0 %  Fiber Density:-

## 2023-06-05 NOTE — PROGRESS NOTES
[facility]  Electrodiagnostic Laboratory                 Department of Neurology                                                                                                         Test Date:  2023    Patient: Cheryl Juarez : 1970 Physician: Ren Jessica MD   Sex: Female AGE: 52 year Ref Phys:    ID#: [id number]   Technician:      History & Examination: This 52 year-old woman presents with an isolated left-sided ptosis of the eye lid in the context long-standing history of migraine and history of treatment with Botox. This study was requested to evaluate status of neuromuscular transmission to evaluate the possibility of patient having myasthenia gravis.    Techniques:  Single fiber EMG was done with a disposable mangofizz jobsu Neuroline 25g concentric needle electrode, and voluntary activation of motor units.   Summary of reference values for CNE jitter studies    Voluntary activation:  OO   Frontalis  EDC    Individual 45   38   43    Mean  31   28   30  Abnormal if 3 outliers - 20 pairs and 4 outliers - 30 pairs    Reference:  Jeronimo Hurst et al. Reference values for jitter recorded by concentric needle electrodes in healthy controls: A multicenter study. 2016. Muscle Nerve 53: 351-362      Results:  Single fiber EMG was of a good technical quality.  The left orbicularis oculi and frontalis muscle was studied with 8 and 19 individual pairs. All orbicularis oculi muscle individual pairs demonstrated jitter ranging from 15  s to 32  s (<45  s).  The mean jitter of the 8 pairs was 26.6  s (nl< 31  s). There was no transmission blocking.  All frontalis muscle individual pairs demonstrated jitter ranging from 17  s to 57  s (<38  s).  The mean jitter of the 19 pairs was 34.5  s (nl< 28  s). There was no transmission blocking.    Interpretation:    This an abnormal study.  There is mild or borderline abnormality in the neuromuscular transmission with involvement only  frontalis muscle (see comment).    Comment: This study demonstrates equivocal results with minimal involvement of the frontalis muscle. There is no NMJ abnormality in the orbicularis oculi muscle. While patchy involvement is common in myasthenia gravis such difference between the muscles could due to proximity of botox injection sites with frontalis muscles. Clinical correlation is required.        Ren Jessica MD  Neuromuscular medicine            SFEMG+     Run  Samples  Blocks  IPI[ s] Jitter[ s] Jitter Norm[ s] LAWANDA[ s] MSD[ s] Freq[Hz]   Right Frontalis   1.1 87 No 792 32.4 <38 32.4 32.9 31.1   2.1 49 No 743 31.2  31.2 36.7 25.9   4.1 79 No 773 35.7  35.7 37.4 27.5   5.1 55 No 780 27.7  28.7 27.7 25.8   7.1 52 No 677 25.6  25.6 34.2 11.5   8.1 70 No 586 34.8  34.8 35.7 24.7   9.1 152 No 431 23.3  23.3 39.0 13.7   10.1 61 No 1116 40.4  40.4 49.0 18.6   11.1 58 No 1482 40.9  40.9 41.4 18.4   12.1 75 No 424 31.4  31.4 34.1 32.2   13.1 70 No 542 17.4  17.4 20.2 31.1   14.1 100 No 395 37.4  40.6 37.4 21.1   15.1 55 No 706 38.7  38.7 42.2 36.1   16.2 69 No 284 33.2  33.2 37.7 16.1   17.1 102 No 854 36.5  39.5 36.5 26.8   17.2 104 No 453 33.8  34.3 33.8 26.8   21.1 93 No 747 41.7  43.1 41.7 30.8   22.2 118 No 357 57.2  57.2 62.9 19.1   23.1 71 No 1942 36.3  36.8 36.3 14.7   Mean     741 34.5 <28.0 35.0 37.7 24.3   SD     406 8.32   8.51 8.47 6.57   Potential Pairs:19     Increased Jitter:0.0 %  Normal Pairs: 100 %   Jitter:               34.5  Blocks:             0.0 %  Fiber Density:-   Right Orbicularis Oculi   1.1 45 No 472 28.6 <45 28.6 51.6 20.2   3.1 32 No 1242 31.7  31.7 35.9 13.5   4.1 47 No 1331 28.3  28.3 35.1 18.5   6.1 74 No 364 33.2  33.2 38.6 21.7   7.1 45 No 334 24.2  24.2 29.5 25.0   8.1 41 No 384 26.7  26.7 29.7 18.5   9.1 117 No 604 24.9  24.9 27.5 14.2   10.1 199 No 735 14.9  14.9 16.1 13.4   Mean     683 26.6 <31 26.6 33.0 17.9   SD     396 5.63   5.63 10.2 3.76   Potential Pairs:8        Increased Jitter:0.0 %  Normal Pairs: 100 %   Jitter:               26.6  Blocks:             0.0 %  Fiber Density:-

## 2023-06-17 ENCOUNTER — HEALTH MAINTENANCE LETTER (OUTPATIENT)
Age: 53
End: 2023-06-17

## 2023-06-29 DIAGNOSIS — G43.709 CHRONIC MIGRAINE WITHOUT AURA WITHOUT STATUS MIGRAINOSUS, NOT INTRACTABLE: ICD-10-CM

## 2023-06-29 NOTE — TELEPHONE ENCOUNTER
Pending Prescriptions:                       Disp   Refills    galcanezumab-gnlm (EMGALITY) 120 MG/ML in*1 mL   11           Sig: Inject 1 mL (120 mg) Subcutaneous every 28 days        Last Appt:11/10/2022  Next Appt: None

## 2023-06-30 NOTE — TELEPHONE ENCOUNTER
Routing refill request to provider for review/approval because:  Drug not on the [facility] refill protocol   Last filled 7/29/22 for 1 year supply  Will send myChart to pt to make follow up appt.   Criselda POLANCO RN, BSN  [facility]

## 2023-12-11 ENCOUNTER — OFFICE VISIT (OUTPATIENT)
Dept: NEUROLOGY | Facility: CLINIC | Age: 53
End: 2023-12-11
Payer: MEDICARE

## 2023-12-11 VITALS
DIASTOLIC BLOOD PRESSURE: 79 MMHG | RESPIRATION RATE: 16 BRPM | HEART RATE: 73 BPM | SYSTOLIC BLOOD PRESSURE: 119 MMHG | OXYGEN SATURATION: 97 %

## 2023-12-11 DIAGNOSIS — G43.709 CHRONIC MIGRAINE WITHOUT AURA WITHOUT STATUS MIGRAINOSUS, NOT INTRACTABLE: ICD-10-CM

## 2023-12-11 PROCEDURE — 99213 OFFICE O/P EST LOW 20 MIN: CPT | Performed by: PSYCHIATRY & NEUROLOGY

## 2023-12-11 RX ORDER — METOCLOPRAMIDE 10 MG/1
10 TABLET ORAL 3 TIMES DAILY PRN
Qty: 20 TABLET | Refills: 11 | Status: SHIPPED | OUTPATIENT
Start: 2023-12-11

## 2023-12-11 ASSESSMENT — HEADACHE IMPACT TEST (HIT 6)
WHEN YOU HAVE HEADACHES HOW OFTEN IS THE PAIN SEVERE: VERY OFTEN
HOW OFTEN DID HEADACHS LIMIT CONCENTRATION ON WORK OR DAILY ACTIVITY: SOMETIMES
HOW OFTEN DO HEADACHES LIMIT YOUR DAILY ACTIVITIES: ALWAYS
HIT6 TOTAL SCORE: 68
HOW OFTEN HAVE YOU FELT TOO TIRED TO WORK BECAUSE OF YOUR HEADACHES: VERY OFTEN
WHEN YOU HAVE A HEADACHE HOW OFTEN DO YOU WISH YOU COULD LIE DOWN: ALWAYS
HOW OFTEN HAVE YOU FELT FED UP OR IRRITATED BECAUSE OF YOUR HEADACHES: SOMETIMES

## 2023-12-11 ASSESSMENT — PAIN SCALES - GENERAL: PAINLEVEL: SEVERE PAIN (7)

## 2024-08-10 ENCOUNTER — HEALTH MAINTENANCE LETTER (OUTPATIENT)
Age: 54
End: 2024-08-10

## 2024-11-30 ENCOUNTER — HOSPITAL ENCOUNTER (EMERGENCY)
Facility: CLINIC | Age: 54
Discharge: HOME | End: 2024-12-01
Attending: EMERGENCY MEDICINE | Admitting: EMERGENCY MEDICINE
Payer: MEDICARE

## 2024-11-30 DIAGNOSIS — R19.7 NAUSEA VOMITING AND DIARRHEA: ICD-10-CM

## 2024-11-30 DIAGNOSIS — E88.89 KETOSIS (H): ICD-10-CM

## 2024-11-30 DIAGNOSIS — R11.2 NAUSEA VOMITING AND DIARRHEA: ICD-10-CM

## 2024-11-30 DIAGNOSIS — R10.9 ABDOMINAL PAIN, UNSPECIFIED ABDOMINAL LOCATION: ICD-10-CM

## 2024-11-30 PROCEDURE — 96375 TX/PRO/DX INJ NEW DRUG ADDON: CPT

## 2024-11-30 PROCEDURE — 96374 THER/PROPH/DIAG INJ IV PUSH: CPT

## 2024-11-30 PROCEDURE — 96361 HYDRATE IV INFUSION ADD-ON: CPT

## 2024-11-30 PROCEDURE — 96376 TX/PRO/DX INJ SAME DRUG ADON: CPT

## 2024-11-30 PROCEDURE — 99285 EMERGENCY DEPT VISIT HI MDM: CPT | Mod: 25

## 2024-11-30 PROCEDURE — 93005 ELECTROCARDIOGRAM TRACING: CPT

## 2024-12-01 ENCOUNTER — APPOINTMENT (OUTPATIENT)
Dept: CT IMAGING | Facility: CLINIC | Age: 54
End: 2024-12-01
Attending: EMERGENCY MEDICINE
Payer: MEDICARE

## 2024-12-01 VITALS
TEMPERATURE: 98.1 F | SYSTOLIC BLOOD PRESSURE: 177 MMHG | OXYGEN SATURATION: 98 % | HEART RATE: 59 BPM | DIASTOLIC BLOOD PRESSURE: 98 MMHG | RESPIRATION RATE: 14 BRPM

## 2024-12-01 LAB
ALBUMIN SERPL BCG-MCNC: 4.6 G/DL (ref 3.5–5.2)
ALBUMIN UR-MCNC: 50 MG/DL
ALP SERPL-CCNC: 91 U/L (ref 40–150)
ALT SERPL W P-5'-P-CCNC: 14 U/L (ref 0–50)
ANION GAP SERPL CALCULATED.3IONS-SCNC: 20 MMOL/L (ref 7–15)
APPEARANCE UR: CLEAR
AST SERPL W P-5'-P-CCNC: 16 U/L (ref 0–45)
B-OH-BUTYR SERPL-SCNC: 0.7 MMOL/L
BASOPHILS # BLD AUTO: 0 10E3/UL (ref 0–0.2)
BASOPHILS NFR BLD AUTO: 0 %
BILIRUB SERPL-MCNC: 0.3 MG/DL
BILIRUB UR QL STRIP: NEGATIVE
BUN SERPL-MCNC: 13.9 MG/DL (ref 6–20)
CALCIUM SERPL-MCNC: 10 MG/DL (ref 8.8–10.4)
CHLORIDE SERPL-SCNC: 100 MMOL/L (ref 98–107)
COLOR UR AUTO: YELLOW
CREAT SERPL-MCNC: 0.6 MG/DL (ref 0.51–0.95)
EGFRCR SERPLBLD CKD-EPI 2021: >90 ML/MIN/1.73M2
EOSINOPHIL # BLD AUTO: 0 10E3/UL (ref 0–0.7)
EOSINOPHIL NFR BLD AUTO: 0 %
ERYTHROCYTE [DISTWIDTH] IN BLOOD BY AUTOMATED COUNT: 12 % (ref 10–15)
FLUAV RNA SPEC QL NAA+PROBE: NEGATIVE
FLUBV RNA RESP QL NAA+PROBE: NEGATIVE
GLUCOSE SERPL-MCNC: 131 MG/DL (ref 70–99)
GLUCOSE UR STRIP-MCNC: NEGATIVE MG/DL
HCO3 SERPL-SCNC: 21 MMOL/L (ref 22–29)
HCT VFR BLD AUTO: 47.3 % (ref 35–47)
HGB BLD-MCNC: 16.3 G/DL (ref 11.7–15.7)
HGB UR QL STRIP: NEGATIVE
HOLD SPECIMEN: NORMAL
IMM GRANULOCYTES # BLD: 0.1 10E3/UL
IMM GRANULOCYTES NFR BLD: 1 %
KETONES UR STRIP-MCNC: 100 MG/DL
LACTATE SERPL-SCNC: 1.4 MMOL/L (ref 0.7–2)
LEUKOCYTE ESTERASE UR QL STRIP: NEGATIVE
LIPASE SERPL-CCNC: 19 U/L (ref 13–60)
LYMPHOCYTES # BLD AUTO: 1.6 10E3/UL (ref 0.8–5.3)
LYMPHOCYTES NFR BLD AUTO: 16 %
MCH RBC QN AUTO: 32.2 PG (ref 26.5–33)
MCHC RBC AUTO-ENTMCNC: 34.5 G/DL (ref 31.5–36.5)
MCV RBC AUTO: 94 FL (ref 78–100)
MONOCYTES # BLD AUTO: 0.8 10E3/UL (ref 0–1.3)
MONOCYTES NFR BLD AUTO: 8 %
MUCOUS THREADS #/AREA URNS LPF: PRESENT /LPF
NEUTROPHILS # BLD AUTO: 7.4 10E3/UL (ref 1.6–8.3)
NEUTROPHILS NFR BLD AUTO: 75 %
NITRATE UR QL: NEGATIVE
NRBC # BLD AUTO: 0 10E3/UL
NRBC BLD AUTO-RTO: 0 /100
PH UR STRIP: 7.5 [PH] (ref 5–7)
PLATELET # BLD AUTO: 385 10E3/UL (ref 150–450)
POTASSIUM SERPL-SCNC: 3.5 MMOL/L (ref 3.4–5.3)
PROT SERPL-MCNC: 7.9 G/DL (ref 6.4–8.3)
RBC # BLD AUTO: 5.06 10E6/UL (ref 3.8–5.2)
RBC URINE: 8 /HPF
RSV RNA SPEC NAA+PROBE: NEGATIVE
SARS-COV-2 RNA RESP QL NAA+PROBE: NEGATIVE
SODIUM SERPL-SCNC: 141 MMOL/L (ref 135–145)
SP GR UR STRIP: 1.03 (ref 1–1.03)
SQUAMOUS EPITHELIAL: 1 /HPF
UROBILINOGEN UR STRIP-MCNC: NORMAL MG/DL
WBC # BLD AUTO: 9.9 10E3/UL (ref 4–11)
WBC URINE: 2 /HPF

## 2024-12-01 PROCEDURE — 250N000011 HC RX IP 250 OP 636: Performed by: EMERGENCY MEDICINE

## 2024-12-01 PROCEDURE — 87637 SARSCOV2&INF A&B&RSV AMP PRB: CPT | Performed by: EMERGENCY MEDICINE

## 2024-12-01 PROCEDURE — 82010 KETONE BODYS QUAN: CPT | Performed by: EMERGENCY MEDICINE

## 2024-12-01 PROCEDURE — 81001 URINALYSIS AUTO W/SCOPE: CPT | Performed by: EMERGENCY MEDICINE

## 2024-12-01 PROCEDURE — 36415 COLL VENOUS BLD VENIPUNCTURE: CPT | Performed by: EMERGENCY MEDICINE

## 2024-12-01 PROCEDURE — 80053 COMPREHEN METABOLIC PANEL: CPT | Performed by: EMERGENCY MEDICINE

## 2024-12-01 PROCEDURE — 250N000009 HC RX 250: Performed by: EMERGENCY MEDICINE

## 2024-12-01 PROCEDURE — 83690 ASSAY OF LIPASE: CPT | Performed by: EMERGENCY MEDICINE

## 2024-12-01 PROCEDURE — 83605 ASSAY OF LACTIC ACID: CPT | Performed by: EMERGENCY MEDICINE

## 2024-12-01 PROCEDURE — 85025 COMPLETE CBC W/AUTO DIFF WBC: CPT | Performed by: EMERGENCY MEDICINE

## 2024-12-01 PROCEDURE — 84075 ASSAY ALKALINE PHOSPHATASE: CPT | Performed by: EMERGENCY MEDICINE

## 2024-12-01 PROCEDURE — 74177 CT ABD & PELVIS W/CONTRAST: CPT | Mod: MG

## 2024-12-01 PROCEDURE — 258N000003 HC RX IP 258 OP 636: Performed by: EMERGENCY MEDICINE

## 2024-12-01 RX ORDER — ONDANSETRON 4 MG/1
4 TABLET, ORALLY DISINTEGRATING ORAL EVERY 8 HOURS PRN
Qty: 10 TABLET | Refills: 0 | Status: SHIPPED | OUTPATIENT
Start: 2024-12-01 | End: 2024-12-04

## 2024-12-01 RX ORDER — HYDROMORPHONE HYDROCHLORIDE 1 MG/ML
0.5 INJECTION, SOLUTION INTRAMUSCULAR; INTRAVENOUS; SUBCUTANEOUS ONCE
Status: COMPLETED | OUTPATIENT
Start: 2024-12-01 | End: 2024-12-01

## 2024-12-01 RX ORDER — ONDANSETRON 2 MG/ML
4 INJECTION INTRAMUSCULAR; INTRAVENOUS ONCE
Status: COMPLETED | OUTPATIENT
Start: 2024-12-01 | End: 2024-12-01

## 2024-12-01 RX ORDER — FAMOTIDINE 20 MG/1
20 TABLET, FILM COATED ORAL 2 TIMES DAILY
Qty: 14 TABLET | Refills: 0 | Status: SHIPPED | OUTPATIENT
Start: 2024-12-01 | End: 2024-12-08

## 2024-12-01 RX ORDER — IOPAMIDOL 755 MG/ML
500 INJECTION, SOLUTION INTRAVASCULAR ONCE
Status: COMPLETED | OUTPATIENT
Start: 2024-12-01 | End: 2024-12-01

## 2024-12-01 RX ORDER — DIPHENHYDRAMINE HYDROCHLORIDE 50 MG/ML
25 INJECTION INTRAMUSCULAR; INTRAVENOUS ONCE
Status: COMPLETED | OUTPATIENT
Start: 2024-12-01 | End: 2024-12-01

## 2024-12-01 RX ADMIN — ONDANSETRON 4 MG: 2 INJECTION INTRAMUSCULAR; INTRAVENOUS at 02:44

## 2024-12-01 RX ADMIN — SODIUM CHLORIDE 65 ML: 9 INJECTION, SOLUTION INTRAVENOUS at 01:49

## 2024-12-01 RX ADMIN — SODIUM CHLORIDE 1000 ML: 9 INJECTION, SOLUTION INTRAVENOUS at 01:14

## 2024-12-01 RX ADMIN — DIPHENHYDRAMINE HYDROCHLORIDE 25 MG: 50 INJECTION, SOLUTION INTRAMUSCULAR; INTRAVENOUS at 03:46

## 2024-12-01 RX ADMIN — HYDROMORPHONE HYDROCHLORIDE 0.5 MG: 1 INJECTION, SOLUTION INTRAMUSCULAR; INTRAVENOUS; SUBCUTANEOUS at 01:16

## 2024-12-01 RX ADMIN — IOPAMIDOL 100 ML: 755 INJECTION, SOLUTION INTRAVENOUS at 01:49

## 2024-12-01 RX ADMIN — ONDANSETRON 4 MG: 2 INJECTION INTRAMUSCULAR; INTRAVENOUS at 01:15

## 2024-12-01 ASSESSMENT — ACTIVITIES OF DAILY LIVING (ADL)
ADLS_ACUITY_SCORE: 45

## 2024-12-01 NOTE — ED PROVIDER NOTES
Emergency Department Note      History of Present Illness     Chief Complaint   Abdominal Pain      HPI   Camila Juarez is a 54 year old female with a history of hypertension who presents with abdominal pain. 2 days ago the patient developed sharp, lower abdominal pain that does not radiate along with nausea. Yesterday she began to have nonbloody vomiting, around 10 episodes, slight nonbloody diarrhea and hot and cold flashes. She denies any alcohol use, no sick contacts. She reports occasional marijuana use. No known sick contacts or suspicious foods. She denies any fever, shortness of breath, chest pain, dysuria or vaginal complaints.     Independent Historian   None    Review of External Notes   11/14/24 office visit    Past Medical History     Medical History and Problem List   Anxiety   Depression   Diabetes   Endometriosis   Fibromyalgia   GERD  Hypertension   Migraine   Rheumatoid arthritis with rheumatoid factor   Acute appendicitis     Medications   Lioresal   Buspar   Flexeril   Neurontin   Ativan   Mobic   Remeron   Provigil   Reglan   Desyrel   Effexor   Seroquel   Protonix   Zocor     Surgical History   Back surgery   Hysterectomy   Laparoscopic appendectomy   Orthopedic surgery     Physical Exam     Patient Vitals for the past 24 hrs:   BP Temp Temp src Pulse Resp SpO2   12/01/24 0345 (!) 177/98 -- -- 59 14 98 %   12/01/24 0206 -- -- -- 63 -- 97 %   12/01/24 0205 (!) 156/82 -- -- 80 -- --   12/01/24 0030 (!) 179/80 -- -- 63 -- 98 %   12/01/24 0022 -- -- -- 58 -- 98 %   12/01/24 0015 (!) 179/113 -- -- -- -- --   12/01/24 0009 (!) 169/106 -- -- 68 -- 99 %   11/30/24 2346 (!) 177/118 98.1  F (36.7  C) Oral 67 22 99 %     Physical Exam  Nursing note and vitals reviewed.  Constitutional: Well nourished.   Eyes: Conjunctiva normal.    ENT: Nose normal. Mucous membranes pink and moist.    Neck: Normal range of motion.  CVS: Normal rate, regular rhythm.  Normal heart sounds.    Pulmonary: Lungs clear to  auscultation bilaterally. No wheezes/rales/rhonchi.  GI: Abdomen soft. Mild generalized tenderness No rigidity or guarding.  No CVA tenderness  MSK: Moves all extremities  Neuro: Alert. Follows simple commands.  Skin: Skin is warm and dry. No rash noted.   Psychiatric: Normal affect.       Diagnostics     Lab Results   Labs Ordered and Resulted from Time of ED Arrival to Time of ED Departure   COMPREHENSIVE METABOLIC PANEL - Abnormal       Result Value    Sodium 141      Potassium 3.5      Carbon Dioxide (CO2) 21 (*)     Anion Gap 20 (*)     Urea Nitrogen 13.9      Creatinine 0.60      GFR Estimate >90      Calcium 10.0      Chloride 100      Glucose 131 (*)     Alkaline Phosphatase 91      AST 16      ALT 14      Protein Total 7.9      Albumin 4.6      Bilirubin Total 0.3     CBC WITH PLATELETS AND DIFFERENTIAL - Abnormal    WBC Count 9.9      RBC Count 5.06      Hemoglobin 16.3 (*)     Hematocrit 47.3 (*)     MCV 94      MCH 32.2      MCHC 34.5      RDW 12.0      Platelet Count 385      % Neutrophils 75      % Lymphocytes 16      % Monocytes 8      % Eosinophils 0      % Basophils 0      % Immature Granulocytes 1      NRBCs per 100 WBC 0      Absolute Neutrophils 7.4      Absolute Lymphocytes 1.6      Absolute Monocytes 0.8      Absolute Eosinophils 0.0      Absolute Basophils 0.0      Absolute Immature Granulocytes 0.1      Absolute NRBCs 0.0     ROUTINE UA WITH MICROSCOPIC - Abnormal    Color Urine Yellow      Appearance Urine Clear      Glucose Urine Negative      Bilirubin Urine Negative      Ketones Urine 100 (*)     Specific Gravity Urine 1.027      Blood Urine Negative      pH Urine 7.5 (*)     Protein Albumin Urine 50 (*)     Urobilinogen Urine Normal      Nitrite Urine Negative      Leukocyte Esterase Urine Negative      Mucus Urine Present (*)     RBC Urine 8 (*)     WBC Urine 2      Squamous Epithelials Urine 1     KETONE BETA-HYDROXYBUTYRATE QUANTITATIVE, RAPID - Abnormal    Ketone  (Beta-Hydroxybutyrate) Quantitative 0.70 (*)    LIPASE - Normal    Lipase 19     LACTIC ACID WHOLE BLOOD WITH 1X REPEAT IN 2 HR WHEN >2 - Normal    Lactic Acid, Initial 1.4     INFLUENZA A/B, RSV AND SARS-COV2 PCR - Normal    Influenza A PCR Negative      Influenza B PCR Negative      RSV PCR Negative      SARS CoV2 PCR Negative         Imaging   No orders to display       EKG   ECG results from 02/17/22   EKG 12-lead, tracing only     Value    Systolic Blood Pressure     Diastolic Blood Pressure     Ventricular Rate 67    Atrial Rate 67    NM Interval 152    QRS Duration 96        QTc 450    P Axis 39    R AXIS -9    T Axis 55    Interpretation ECG      Sinus rhythm  Minimal voltage criteria for LVH, may be normal variant  Borderline ECG  When compared with ECG of 21-JUN-2020 14:31,  Vent. rate has decreased BY  37 BPM           Independent Interpretation   None    ED Course      Medications Administered   Medications   sodium chloride 0.9% BOLUS 1,000 mL (0 mLs Intravenous Stopped 12/1/24 0218)   ondansetron (ZOFRAN) injection 4 mg (4 mg Intravenous $Given 12/1/24 0115)   HYDROmorphone (PF) (DILAUDID) injection 0.5 mg (0.5 mg Intravenous $Given 12/1/24 0116)   CT scan flush (65 mLs Intravenous $Given 12/1/24 0149)   iopamidol (ISOVUE-370) solution 500 mL (100 mLs Intravenous $Given 12/1/24 0149)   ondansetron (ZOFRAN) injection 4 mg (4 mg Intravenous $Given 12/1/24 0244)   diphenhydrAMINE (BENADRYL) injection 25 mg (25 mg Intravenous $Given 12/1/24 0346)       Procedures   Procedures     Discussion of Management   None    ED Course   ED Course as of 12/01/24 0040   Sun Dec 01, 2024   0034 I initially assessed the patient and obtained the above history and physical exam.        Additional Documentation  None    Medical Decision Making / Diagnosis     CMS Diagnoses: None    MIPS       None    [facility]   Camilatere Juarez is a 54 year old female presenting with predominant complaints of abdominal pain, nausea,  vomiting and diarrhea.  She has generalized abdominal tenderness on exam though fortunately no evidence of intra-abdominal catastrophe on CT.  Labs with noted mild elevated anion gap I suspect more secondary to starvation ketosis.  No profound electrolyte derangements.  LFTs/lipase normal.  UA without evidence of obvious infection.  She tested negative for COVID-19/influenza/RSV.  No reported pelvic complaints and I doubt pelvic etiology to explain her presentation.  After IV fluids, antiemetics.  Pepcid, patient did report some symptom improvement.  She was tolerating p.o.  Stronger suspicion for more viral etiology though I discussed with patient cannot exclude underlying gastritis.  Will plan for Pepcid as well as ODT Zofran on discharge.  Discussed utilization of Tylenol to hold off on ibuprofen use.  Dietary recommendations discussed.  Planning close PCP follow-up.  Return precautions given.      Disposition   The patient was discharged.     Diagnosis     ICD-10-CM    1. Abdominal pain, unspecified abdominal location  R10.9       2. Nausea vomiting and diarrhea  R11.2     R19.7       3. Ketosis (H)  E88.89            Discharge Medications   Discharge Medication List as of 12/1/2024  3:52 AM        START taking these medications    Details   !! ondansetron (ZOFRAN ODT) 4 MG ODT tab Take 1 tablet (4 mg) by mouth every 8 hours as needed for nausea., Disp-10 tablet, R-0, E-Prescribe       !! - Potential duplicate medications found. Please discuss with provider.        Scribe Disclosure:  I, Javy Quiroz, am serving as a scribe at 12:24 AM on 12/1/2024 to document services personally performed by Lyudmila Knox DO based on my observations and the provider's statements to me.        Lyudmila Knox DO  12/01/24 0656

## 2024-12-01 NOTE — ED TRIAGE NOTES
Presents via EMS with c/o abdominal pain along with nausea and vomiting that began yesterday and has since worsened. Stated a sharp pain in the middle abdomen. Also stating chills with some loose stools.     Triage Assessment (Adult)       Row Name 11/30/24 4629          Triage Assessment    Airway WDL WDL        Respiratory WDL    Respiratory WDL WDL        Skin Circulation/Temperature WDL    Skin Circulation/Temperature WDL WDL        Cardiac WDL    Cardiac WDL WDL        Peripheral/Neurovascular WDL    Peripheral Neurovascular WDL WDL        Cognitive/Neuro/Behavioral WDL    Cognitive/Neuro/Behavioral WDL WDL

## 2024-12-04 ENCOUNTER — VIRTUAL VISIT (OUTPATIENT)
Dept: NEUROLOGY | Facility: CLINIC | Age: 54
End: 2024-12-04
Payer: MEDICARE

## 2024-12-04 DIAGNOSIS — G43.709 CHRONIC MIGRAINE WITHOUT AURA WITHOUT STATUS MIGRAINOSUS, NOT INTRACTABLE: ICD-10-CM

## 2024-12-04 RX ORDER — SEMAGLUTIDE 1.34 MG/ML
INJECTION, SOLUTION SUBCUTANEOUS
COMMUNITY
Start: 2024-01-31

## 2024-12-04 RX ORDER — METOCLOPRAMIDE 10 MG/1
10 TABLET ORAL 3 TIMES DAILY PRN
Qty: 20 TABLET | Refills: 11 | Status: SHIPPED | OUTPATIENT
Start: 2024-12-04

## 2024-12-04 ASSESSMENT — MIGRAINE DISABILITY ASSESSMENT (MIDAS)
HOW MANY DAYS IN THE PAST 3 MONTHS HAVE YOU HAD A HEADACHE: 2
TOTAL SCORE: 10
HOW MANY DAYS WAS HOUSEWORK PRODUCTIVITY CUT IN HALF DUE TO HEADACHES: 4
HOW MANY DAYS DID YOU NOT DO HOUSEWORK BECAUSE OF HEADACHES: 4
ON A SCALE FROM 0-10 ON AVERAGE HOW PAINFUL WERE HEADACHES: 7
HOW OFTEN WERE SOCIAL ACTIVITIES MISSED DUE TO HEADACHES: 2
HOW MANY DAYS DID YOU MISS WORK OR SCHOOL BECAUSE OF HEADACHES: 0
HOW MANY DAYS WAS YOUR PRODUCTIVITY CUT IN HALF BECAUSE OF HEADACHES: 0

## 2024-12-04 ASSESSMENT — HEADACHE IMPACT TEST (HIT 6)
HOW OFTEN DO HEADACHES LIMIT YOUR DAILY ACTIVITIES: SOMETIMES
WHEN YOU HAVE A HEADACHE HOW OFTEN DO YOU WISH YOU COULD LIE DOWN: ALWAYS
HOW OFTEN HAVE YOU FELT TOO TIRED TO WORK BECAUSE OF YOUR HEADACHES: SOMETIMES
WHEN YOU HAVE HEADACHES HOW OFTEN IS THE PAIN SEVERE: SOMETIMES
HIT6 TOTAL SCORE: 63
HOW OFTEN DID HEADACHS LIMIT CONCENTRATION ON WORK OR DAILY ACTIVITY: SOMETIMES
HOW OFTEN HAVE YOU FELT FED UP OR IRRITATED BECAUSE OF YOUR HEADACHES: SOMETIMES

## 2024-12-04 ASSESSMENT — PAIN SCALES - GENERAL: PAINLEVEL_OUTOF10: MILD PAIN (3)

## 2024-12-04 NOTE — NURSING NOTE
Current patient location: [address] [place] [id number]    Is the patient currently in the state of [place]? YES    Visit mode:VIDEO    If the visit is dropped, the patient can be reconnected by:VIDEO VISIT: Text to cell phone:   Telephone Information:   Mobile [phone]       Will anyone else be joining the visit? NO  (If patient encounters technical issues they should call [phone]    Are changes needed to the allergy or medication list? No    Are refills needed on medications prescribed by this physician? NO    Rooming Documentation:  Questionnaire(s) completed    Reason for visit: Follow Up    Cortney ROMERO

## 2024-12-04 NOTE — LETTER
12/4/2024       RE: Camila Juarez  [address] [facility] [address]     Dear Colleague,    Thank you for referring your patient, Camila Juarez, to the [facility] [place] at [facility]. Please see a copy of my visit note below.    Virtual Visit Details    Type of service:  Video Visit   Video Start Time: 8:16 AM  Video End Time: 8:25 AM    Originating Location (pt. Location): Home    Distant Location (provider location):  Off-site  Platform used for Video Visit: [facility]    Headache Neurology Progress Note  December 4, 2024      Assessment/Plan:   Camila Juarez is a 54 year old woman returns for follow-up of chronic migraine.  She continues to have good benefit with Emgality subcutaneous injection every 28 days.  Previously, she had trialed botulinum toxin injections, although this worsened baseline ptosis and was stopped.     I recommend she continue Emgality subcutaneous injection every 28 days.  We will plan to continue this for the next 1 year.     For acute treatment, she will continue metoclopramide 10 mg as needed for headache or nausea.  I have provided 1 year of refills for her.     I will plan to see her back in 1 year, or sooner if needed.         The longitudinal plan of care for Cheryl was addressed during this visit. Due to the added complexity in care, I will continue to support Cheryl in the subsequent management of this condition(s) and with the ongoing continuity of care of this condition(s).      Brooke Morales MD  Neurology     Subjective:    Camila Juarez returns for follow up of chronic migraine.    Emgality continues to work well. She did well in the summer despite heat.  Heat is usually a trigger for her.    She takes acute medication, metoclopramide, as needed.  This remains effective.  She is rarely using this.    Overall, she reports that headaches are  less frequent and shorter.  She is able to adequately treat them and prevent multiday long attacks.    Last bad attack was 2 months ago.    Otherwise, she reports recent hospitalization on related to headache.  She had intractable abdominal pain and vomiting x 4 days. Suspected secondary to medical marijuana, she reports.  She also wonders about Ozempic.    Objective:    Vitals: There were no vitals taken for this visit.  General: Cooperative, NAD  Neurologic:  Mental Status: Fully alert, attentive and oriented. Speech clear and fluent.   Cranial Nerves: Facial movements symmetric.   Motor: No abnormal movements.      Pertinent Investigations:          11/10/2022     3:21 PM 12/11/2023     7:40 AM 12/4/2024     7:42 AM   HIT-6   When you have headaches, how often is the pain severe 13  11  10    How often do headaches limit your ability to do usual daily activities including household work, work, school, or social activities? 13  13  10    When you have a headache, how often do you wish you could lie down? 13  13  13    In the past 4 weeks, how often have you felt too tired to do work or daily activities because of your headaches 13  11  10    In the past 4 weeks, how often have you felt fed up or irritated because of your headaches 13  10  10    In the past 4 weeks, how often did headaches limit your ability to concentrate on work or daily activities 13  10  10    HIT-6 Total Score 78 68 63        Patient-reported           11/10/2022     3:23 PM 12/11/2023     7:42 AM 12/4/2024     7:43 AM   MIDAS - in the past three months:   On how many days did you miss work or school because of your headaches? 0 0 0   How many days was your productivity at work or school reduced by half or more because of your headaches? 0 0 0   On how many days did you not do household work because of your headaches? 9 6 4   How many days was your productivity in household work reduced by half or more because of your headaches? 9 6 4   On how  many days did you miss family, social, or leisure activities because of your headaches? 9 6 2   On how many days did you have a headache? 9 6 2   On a scale of 0-10, on average how painful were these headaches? 10 8 7   MIDAS Score 27 (IV - Severe Disability) 18 (III - Moderate Disability) 10 (II - Mild Disability)          Again, thank you for allowing me to participate in the care of your patient.      Sincerely,    Brooke Morales MD

## 2024-12-04 NOTE — PROGRESS NOTES
Virtual Visit Details    Type of service:  Video Visit   Video Start Time: 8:16 AM  Video End Time: 8:25 AM    Originating Location (pt. Location): Home    Distant Location (provider location):  Off-site  Platform used for Video Visit: [facility]    Headache Neurology Progress Note  December 4, 2024      Assessment/Plan:   Camila Juarez is a 54 year old woman returns for follow-up of chronic migraine.  She continues to have good benefit with Emgality subcutaneous injection every 28 days.  Previously, she had trialed botulinum toxin injections, although this worsened baseline ptosis and was stopped.     I recommend she continue Emgality subcutaneous injection every 28 days.  We will plan to continue this for the next 1 year.     For acute treatment, she will continue metoclopramide 10 mg as needed for headache or nausea.  I have provided 1 year of refills for her.     I will plan to see her back in 1 year, or sooner if needed.         The longitudinal plan of care for Cheryl was addressed during this visit. Due to the added complexity in care, I will continue to support Cheryl in the subsequent management of this condition(s) and with the ongoing continuity of care of this condition(s).      Brooke Morales MD  Neurology     Subjective:    Camila Juarez returns for follow up of chronic migraine.    Emgality continues to work well. She did well in the summer despite heat.  Heat is usually a trigger for her.    She takes acute medication, metoclopramide, as needed.  This remains effective.  She is rarely using this.    Overall, she reports that headaches are less frequent and shorter.  She is able to adequately treat them and prevent multiday long attacks.    Last bad attack was 2 months ago.    Otherwise, she reports recent hospitalization on related to headache.  She had intractable abdominal pain and vomiting x 4 days. Suspected secondary to medical marijuana, she  reports.  She also wonders about Ozempic.    Objective:    Vitals: There were no vitals taken for this visit.  General: Cooperative, NAD  Neurologic:  Mental Status: Fully alert, attentive and oriented. Speech clear and fluent.   Cranial Nerves: Facial movements symmetric.   Motor: No abnormal movements.      Pertinent Investigations:          11/10/2022     3:21 PM 12/11/2023     7:40 AM 12/4/2024     7:42 AM   HIT-6   When you have headaches, how often is the pain severe 13  11  10    How often do headaches limit your ability to do usual daily activities including household work, work, school, or social activities? 13  13  10    When you have a headache, how often do you wish you could lie down? 13  13  13    In the past 4 weeks, how often have you felt too tired to do work or daily activities because of your headaches 13  11  10    In the past 4 weeks, how often have you felt fed up or irritated because of your headaches 13  10  10    In the past 4 weeks, how often did headaches limit your ability to concentrate on work or daily activities 13  10  10    HIT-6 Total Score 78 68 63        Patient-reported           11/10/2022     3:23 PM 12/11/2023     7:42 AM 12/4/2024     7:43 AM   MIDAS - in the past three months:   On how many days did you miss work or school because of your headaches? 0 0 0   How many days was your productivity at work or school reduced by half or more because of your headaches? 0 0 0   On how many days did you not do household work because of your headaches? 9 6 4   How many days was your productivity in household work reduced by half or more because of your headaches? 9 6 4   On how many days did you miss family, social, or leisure activities because of your headaches? 9 6 2   On how many days did you have a headache? 9 6 2   On a scale of 0-10, on average how painful were these headaches? 10 8 7   MIDAS Score 27 (IV - Severe Disability) 18 (III - Moderate Disability) 10 (II - Mild  Disability)

## 2025-02-04 ENCOUNTER — TELEPHONE (OUTPATIENT)
Dept: NEUROLOGY | Facility: CLINIC | Age: 55
End: 2025-02-04
Payer: MEDICARE

## 2025-02-04 NOTE — TELEPHONE ENCOUNTER
Left Voicemail (2nd Attempt) for the patient to call back and schedule the following:    Appointment type: Return Headache  Provider: Andrew  Return date: around 12/4/25  Specialty phone number: [phone]  Additional appointment(s) needed: NA  Additonal Notes: 1 year follow up    Manuela Tobar on 2/4/2025 at 9:28 AM

## 2025-08-16 ENCOUNTER — HEALTH MAINTENANCE LETTER (OUTPATIENT)
Age: 55
End: 2025-08-16